# Patient Record
Sex: FEMALE | Race: WHITE | NOT HISPANIC OR LATINO | URBAN - METROPOLITAN AREA
[De-identification: names, ages, dates, MRNs, and addresses within clinical notes are randomized per-mention and may not be internally consistent; named-entity substitution may affect disease eponyms.]

---

## 2017-01-01 ENCOUNTER — HOME CARE/HOSPICE - HEALTHEAST (OUTPATIENT)
Dept: HOME HEALTH SERVICES | Facility: HOME HEALTH | Age: 82
End: 2017-01-01

## 2017-01-01 ENCOUNTER — RECORDS - HEALTHEAST (OUTPATIENT)
Dept: ADMINISTRATIVE | Facility: OTHER | Age: 82
End: 2017-01-01

## 2017-01-01 ENCOUNTER — OFFICE VISIT - HEALTHEAST (OUTPATIENT)
Dept: CARDIOLOGY | Facility: CLINIC | Age: 82
End: 2017-01-01

## 2017-01-01 ENCOUNTER — AMBULATORY - HEALTHEAST (OUTPATIENT)
Dept: CARDIOLOGY | Facility: CLINIC | Age: 82
End: 2017-01-01

## 2017-01-01 ENCOUNTER — INFUSION - HEALTHEAST (OUTPATIENT)
Dept: INFUSION THERAPY | Facility: HOSPITAL | Age: 82
End: 2017-01-01

## 2017-01-01 DIAGNOSIS — D64.9 ANEMIA: ICD-10-CM

## 2017-01-01 DIAGNOSIS — C91.10 CLL (CHRONIC LYMPHOCYTIC LEUKEMIA) (H): ICD-10-CM

## 2017-01-01 DIAGNOSIS — I35.0 SEVERE AORTIC STENOSIS: ICD-10-CM

## 2017-01-01 DIAGNOSIS — I35.0 AORTIC STENOSIS: ICD-10-CM

## 2017-01-01 DIAGNOSIS — I50.30 HEART FAILURE WITH PRESERVED EJECTION FRACTION (H): ICD-10-CM

## 2017-01-01 DIAGNOSIS — I10 ESSENTIAL HYPERTENSION: ICD-10-CM

## 2017-01-01 LAB
ATRIAL RATE - MUSE: 76 BPM
BLD PROD TYP BPU: NORMAL
BLD PROD TYP BPU: NORMAL
BLOOD EXPIRATION DATE: NORMAL
BLOOD EXPIRATION DATE: NORMAL
BLOOD TYPE: 5100
BLOOD TYPE: 5100
CODING SYSTEM: NORMAL
CODING SYSTEM: NORMAL
COMPONENT (HISTORICAL CONVERSION): NORMAL
COMPONENT (HISTORICAL CONVERSION): NORMAL
CROSSMATCH: NORMAL
CROSSMATCH: NORMAL
DIASTOLIC BLOOD PRESSURE - MUSE: NORMAL MMHG
INTERPRETATION ECG - MUSE: NORMAL
ISSUE DATE AND TIME: NORMAL
ISSUE DATE AND TIME: NORMAL
P AXIS - MUSE: 22 DEGREES
PR INTERVAL - MUSE: 154 MS
QRS DURATION - MUSE: 74 MS
QT - MUSE: 366 MS
QTC - MUSE: 411 MS
R AXIS - MUSE: -18 DEGREES
REF LAB ANTIBODY IDENTIFICATION: NORMAL
STATUS (HISTORICAL CONVERSION): NORMAL
STATUS (HISTORICAL CONVERSION): NORMAL
SYSTOLIC BLOOD PRESSURE - MUSE: NORMAL MMHG
T AXIS - MUSE: 71 DEGREES
UNIT ABO/RH (HISTORICAL CONVERSION): NORMAL
UNIT ABO/RH (HISTORICAL CONVERSION): NORMAL
UNIT NUMBER: NORMAL
UNIT NUMBER: NORMAL
VENTRICULAR RATE- MUSE: 76 BPM

## 2017-01-01 ASSESSMENT — MIFFLIN-ST. JEOR: SCORE: 1017.79

## 2017-01-18 ENCOUNTER — INFUSION - HEALTHEAST (OUTPATIENT)
Dept: INFUSION THERAPY | Facility: HOSPITAL | Age: 82
End: 2017-01-18

## 2017-01-18 ENCOUNTER — AMBULATORY - HEALTHEAST (OUTPATIENT)
Dept: INFUSION THERAPY | Facility: HOSPITAL | Age: 82
End: 2017-01-18

## 2017-01-18 ENCOUNTER — RECORDS - HEALTHEAST (OUTPATIENT)
Dept: ADMINISTRATIVE | Facility: OTHER | Age: 82
End: 2017-01-18

## 2017-01-18 DIAGNOSIS — C91.10 LEUKEMIA, LYMPHOCYTIC, CHRONIC (H): ICD-10-CM

## 2017-01-18 DIAGNOSIS — N18.9 ANEMIA OF CHRONIC RENAL FAILURE: ICD-10-CM

## 2017-01-18 DIAGNOSIS — D63.1 ANEMIA OF CHRONIC RENAL FAILURE: ICD-10-CM

## 2017-01-19 ENCOUNTER — INFUSION - HEALTHEAST (OUTPATIENT)
Dept: INFUSION THERAPY | Facility: HOSPITAL | Age: 82
End: 2017-01-19

## 2017-01-19 DIAGNOSIS — C91.10 CLL (CHRONIC LYMPHOCYTIC LEUKEMIA) (H): ICD-10-CM

## 2017-01-19 DIAGNOSIS — N18.9 ANEMIA OF CHRONIC RENAL FAILURE: ICD-10-CM

## 2017-01-19 DIAGNOSIS — C91.10 LEUKEMIA, LYMPHOCYTIC, CHRONIC (H): ICD-10-CM

## 2017-01-19 DIAGNOSIS — D63.1 ANEMIA OF CHRONIC RENAL FAILURE: ICD-10-CM

## 2017-01-19 LAB — ANTIBODY UNIDENTIFIED: NORMAL

## 2017-01-20 LAB
BLD PROD TYP BPU: NORMAL
BLD PROD TYP BPU: NORMAL
BLOOD EXPIRATION DATE: NORMAL
BLOOD EXPIRATION DATE: NORMAL
BLOOD TYPE: 5100
BLOOD TYPE: 5100
CODING SYSTEM: NORMAL
CODING SYSTEM: NORMAL
COMPONENT (HISTORICAL CONVERSION): NORMAL
COMPONENT (HISTORICAL CONVERSION): NORMAL
CROSSMATCH: NORMAL
CROSSMATCH: NORMAL
ISSUE DATE AND TIME: NORMAL
ISSUE DATE AND TIME: NORMAL
STATUS (HISTORICAL CONVERSION): NORMAL
STATUS (HISTORICAL CONVERSION): NORMAL
UNIT ABO/RH (HISTORICAL CONVERSION): NORMAL
UNIT ABO/RH (HISTORICAL CONVERSION): NORMAL
UNIT NUMBER: NORMAL
UNIT NUMBER: NORMAL

## 2017-01-22 ENCOUNTER — RECORDS - HEALTHEAST (OUTPATIENT)
Dept: ADMINISTRATIVE | Facility: OTHER | Age: 82
End: 2017-01-22

## 2017-01-25 LAB — REF LAB ANTIBODY IDENTIFICATION: NORMAL

## 2017-07-26 ENCOUNTER — RECORDS - HEALTHEAST (OUTPATIENT)
Dept: ADMINISTRATIVE | Facility: OTHER | Age: 82
End: 2017-07-26

## 2018-01-01 ENCOUNTER — RECORDS - HEALTHEAST (OUTPATIENT)
Dept: ADMINISTRATIVE | Facility: OTHER | Age: 83
End: 2018-01-01

## 2018-01-01 ENCOUNTER — COMMUNICATION - HEALTHEAST (OUTPATIENT)
Dept: CARDIOLOGY | Facility: CLINIC | Age: 83
End: 2018-01-01

## 2018-01-01 ENCOUNTER — ANESTHESIA - HEALTHEAST (OUTPATIENT)
Dept: SURGERY | Facility: HOSPITAL | Age: 83
End: 2018-01-01

## 2018-01-01 ENCOUNTER — OFFICE VISIT - HEALTHEAST (OUTPATIENT)
Dept: GERIATRICS | Facility: CLINIC | Age: 83
End: 2018-01-01

## 2018-01-01 ENCOUNTER — INFUSION - HEALTHEAST (OUTPATIENT)
Dept: INFUSION THERAPY | Facility: HOSPITAL | Age: 83
End: 2018-01-01

## 2018-01-01 ENCOUNTER — OFFICE VISIT - HEALTHEAST (OUTPATIENT)
Dept: CARDIOLOGY | Facility: CLINIC | Age: 83
End: 2018-01-01

## 2018-01-01 ENCOUNTER — SURGERY - HEALTHEAST (OUTPATIENT)
Dept: CARDIOLOGY | Facility: CLINIC | Age: 83
End: 2018-01-01

## 2018-01-01 ENCOUNTER — AMBULATORY - HEALTHEAST (OUTPATIENT)
Dept: CARDIOLOGY | Facility: CLINIC | Age: 83
End: 2018-01-01

## 2018-01-01 ENCOUNTER — COMMUNICATION - HEALTHEAST (OUTPATIENT)
Dept: HOME HEALTH SERVICES | Facility: HOME HEALTH | Age: 83
End: 2018-01-01

## 2018-01-01 ENCOUNTER — HOME CARE/HOSPICE - HEALTHEAST (OUTPATIENT)
Dept: HOME HEALTH SERVICES | Facility: HOME HEALTH | Age: 83
End: 2018-01-01

## 2018-01-01 ENCOUNTER — RECORDS - HEALTHEAST (OUTPATIENT)
Dept: LAB | Facility: CLINIC | Age: 83
End: 2018-01-01

## 2018-01-01 ENCOUNTER — ANESTHESIA - HEALTHEAST (OUTPATIENT)
Dept: SURGERY | Facility: CLINIC | Age: 83
End: 2018-01-01

## 2018-01-01 ENCOUNTER — AMBULATORY - HEALTHEAST (OUTPATIENT)
Dept: GERIATRICS | Facility: CLINIC | Age: 83
End: 2018-01-01

## 2018-01-01 ENCOUNTER — SURGERY - HEALTHEAST (OUTPATIENT)
Dept: SURGERY | Facility: HOSPITAL | Age: 83
End: 2018-01-01

## 2018-01-01 ENCOUNTER — SURGERY - HEALTHEAST (OUTPATIENT)
Dept: SURGERY | Facility: CLINIC | Age: 83
End: 2018-01-01

## 2018-01-01 ENCOUNTER — INFUSION - HEALTHEAST (OUTPATIENT)
Dept: INFUSION THERAPY | Facility: CLINIC | Age: 83
End: 2018-01-01

## 2018-01-01 ENCOUNTER — COMMUNICATION - HEALTHEAST (OUTPATIENT)
Dept: HEALTH INFORMATION MANAGEMENT | Facility: CLINIC | Age: 83
End: 2018-01-01

## 2018-01-01 ENCOUNTER — HOME CARE/HOSPICE - HEALTHEAST (OUTPATIENT)
Dept: HOSPICE | Facility: HOSPICE | Age: 83
End: 2018-01-01

## 2018-01-01 ENCOUNTER — COMMUNICATION - HEALTHEAST (OUTPATIENT)
Dept: INFUSION THERAPY | Facility: HOSPITAL | Age: 83
End: 2018-01-01

## 2018-01-01 ENCOUNTER — HOSPITAL ENCOUNTER (OUTPATIENT)
Dept: RADIOLOGY | Facility: CLINIC | Age: 83
Discharge: HOME | End: 2018-06-11
Attending: INTERNAL MEDICINE

## 2018-01-01 ENCOUNTER — ANESTHESIA - HEALTHEAST (OUTPATIENT)
Dept: CARDIOLOGY | Facility: CLINIC | Age: 83
End: 2018-01-01

## 2018-01-01 ENCOUNTER — AMBULATORY - HEALTHEAST (OUTPATIENT)
Dept: INFUSION THERAPY | Facility: HOSPITAL | Age: 83
End: 2018-01-01

## 2018-01-01 ENCOUNTER — AMBULATORY - HEALTHEAST (OUTPATIENT)
Dept: MEDSURG UNIT | Facility: HOSPITAL | Age: 83
End: 2018-01-01

## 2018-01-01 ENCOUNTER — HOSPITAL ENCOUNTER (OUTPATIENT)
Dept: CARDIOLOGY | Facility: CLINIC | Age: 83
Discharge: HOME | End: 2018-07-12
Attending: INTERNAL MEDICINE

## 2018-01-01 DIAGNOSIS — I35.0 AORTIC STENOSIS: ICD-10-CM

## 2018-01-01 DIAGNOSIS — C91.10 CHRONIC LYMPHOID LEUKEMIA (H): ICD-10-CM

## 2018-01-01 DIAGNOSIS — Z95.2 S/P TAVR (TRANSCATHETER AORTIC VALVE REPLACEMENT): ICD-10-CM

## 2018-01-01 DIAGNOSIS — C91.10 CLL (CHRONIC LYMPHOCYTIC LEUKEMIA) (H): ICD-10-CM

## 2018-01-01 DIAGNOSIS — D64.9 ANEMIA: ICD-10-CM

## 2018-01-01 DIAGNOSIS — I48.91 ATRIAL FIBRILLATION (H): ICD-10-CM

## 2018-01-01 DIAGNOSIS — D64.9 POSTOPERATIVE ANEMIA: ICD-10-CM

## 2018-01-01 DIAGNOSIS — I50.9 CONGESTIVE HEART FAILURE (H): ICD-10-CM

## 2018-01-01 DIAGNOSIS — D72.829 LEUKOCYTOSIS: ICD-10-CM

## 2018-01-01 DIAGNOSIS — I50.23 ACUTE ON CHRONIC SYSTOLIC HEART FAILURE, NYHA CLASS 3 (H): ICD-10-CM

## 2018-01-01 DIAGNOSIS — A04.72 C. DIFFICILE COLITIS: ICD-10-CM

## 2018-01-01 DIAGNOSIS — I50.9 CHF (CONGESTIVE HEART FAILURE) (H): ICD-10-CM

## 2018-01-01 DIAGNOSIS — J18.9 PNEUMONIA: ICD-10-CM

## 2018-01-01 DIAGNOSIS — N17.9 ACUTE KIDNEY INJURY (H): ICD-10-CM

## 2018-01-01 DIAGNOSIS — I35.0 AORTIC VALVE STENOSIS, ETIOLOGY OF CARDIAC VALVE DISEASE UNSPECIFIED: ICD-10-CM

## 2018-01-01 DIAGNOSIS — I50.30 HEART FAILURE WITH PRESERVED EJECTION FRACTION (H): ICD-10-CM

## 2018-01-01 DIAGNOSIS — C91.10 CHRONIC LYMPHOCYTIC LEUKEMIA (H): ICD-10-CM

## 2018-01-01 DIAGNOSIS — I35.0 SEVERE AORTIC STENOSIS: ICD-10-CM

## 2018-01-01 DIAGNOSIS — I48.19 PERSISTENT ATRIAL FIBRILLATION (H): ICD-10-CM

## 2018-01-01 DIAGNOSIS — D64.9 ANEMIA, UNSPECIFIED TYPE: ICD-10-CM

## 2018-01-01 DIAGNOSIS — J96.01 ACUTE RESPIRATORY FAILURE WITH HYPOXIA (H): ICD-10-CM

## 2018-01-01 DIAGNOSIS — I50.30 DIASTOLIC CONGESTIVE HEART FAILURE (H): ICD-10-CM

## 2018-01-01 DIAGNOSIS — K64.9 HEMORRHOID: ICD-10-CM

## 2018-01-01 DIAGNOSIS — H10.029 PINK EYE: ICD-10-CM

## 2018-01-01 LAB
ABO/RH(D): ABNORMAL
ABO/RH(D): NORMAL
ALBUMIN SERPL-MCNC: 2.4 G/DL (ref 3.5–5)
ALBUMIN SERPL-MCNC: 3.1 G/DL (ref 3.5–5)
ALBUMIN SERPL-MCNC: 3.2 G/DL (ref 3.5–5)
ALBUMIN SERPL-MCNC: 3.3 G/DL (ref 3.5–5)
ALP SERPL-CCNC: 275 U/L (ref 45–120)
ALP SERPL-CCNC: 84 U/L (ref 45–120)
ALP SERPL-CCNC: 88 U/L (ref 45–120)
ALP SERPL-CCNC: 99 U/L (ref 45–120)
ALT SERPL W P-5'-P-CCNC: 11 U/L (ref 0–45)
ALT SERPL W P-5'-P-CCNC: 14 U/L (ref 0–45)
ALT SERPL W P-5'-P-CCNC: 19 U/L (ref 0–45)
ALT SERPL W P-5'-P-CCNC: 26 U/L (ref 0–45)
ANION GAP SERPL CALCULATED.3IONS-SCNC: 12 MMOL/L (ref 5–18)
ANION GAP SERPL CALCULATED.3IONS-SCNC: 13 MMOL/L (ref 5–18)
ANION GAP SERPL CALCULATED.3IONS-SCNC: 13 MMOL/L (ref 5–18)
ANION GAP SERPL CALCULATED.3IONS-SCNC: 14 MMOL/L (ref 5–18)
ANION GAP SERPL CALCULATED.3IONS-SCNC: 15 MMOL/L (ref 5–18)
ANION GAP SERPL CALCULATED.3IONS-SCNC: 16 MMOL/L (ref 5–18)
ANION GAP SERPL CALCULATED.3IONS-SCNC: 16 MMOL/L (ref 5–18)
ANTIBODY SCREEN: NEGATIVE
ANTIBODY SCREEN: POSITIVE
ANTIBODY UNIDENTIFIED: NORMAL
AORTIC ROOT: 2.9 CM
AORTIC VALVE MEAN VELOCITY: 174 CM/S
AST SERPL W P-5'-P-CCNC: 18 U/L (ref 0–40)
AST SERPL W P-5'-P-CCNC: 20 U/L (ref 0–40)
AST SERPL W P-5'-P-CCNC: 21 U/L (ref 0–40)
AST SERPL W P-5'-P-CCNC: 31 U/L (ref 0–40)
ATRIAL RATE - MUSE: 69 BPM
ATRIAL RATE - MUSE: 75 BPM
AV DIMENSIONLESS INDEX VTI: 0.3
AV MEAN GRADIENT: 15 MMHG
AV PEAK GRADIENT: 30.9 MMHG
AV VALVE AREA: 0.7 CM2
AV VELOCITY RATIO: 0.3
BACTERIA SPEC CULT: ABNORMAL
BASOPHILS # BLD AUTO: 0 THOU/UL (ref 0–0.2)
BASOPHILS NFR BLD AUTO: 0 % (ref 0–2)
BILIRUB SERPL-MCNC: 0.4 MG/DL (ref 0–1)
BILIRUB SERPL-MCNC: 0.5 MG/DL (ref 0–1)
BILIRUB SERPL-MCNC: 0.6 MG/DL (ref 0–1)
BILIRUB SERPL-MCNC: 2 MG/DL (ref 0–1)
BLD PROD TYP BPU: NORMAL
BLOOD EXPIRATION DATE: NORMAL
BLOOD TYPE: 5100
BNP SERPL-MCNC: 2528 PG/ML (ref 0–167)
BNP SERPL-MCNC: 563 PG/ML (ref 0–167)
BNP SERPL-MCNC: 696 PG/ML (ref 0–167)
BNP SERPL-MCNC: 878 PG/ML (ref 0–167)
BSA FOR ECHO PROCEDURE: 1.57 M2
BUN SERPL-MCNC: 38 MG/DL (ref 8–28)
BUN SERPL-MCNC: 40 MG/DL (ref 8–28)
BUN SERPL-MCNC: 42 MG/DL (ref 8–28)
BUN SERPL-MCNC: 46 MG/DL (ref 8–28)
BUN SERPL-MCNC: 47 MG/DL (ref 8–28)
BUN SERPL-MCNC: 57 MG/DL (ref 8–28)
BUN SERPL-MCNC: 62 MG/DL (ref 8–28)
BUN SERPL-MCNC: 66 MG/DL (ref 8–28)
BUN SERPL-MCNC: 66 MG/DL (ref 8–28)
BUN SERPL-MCNC: 72 MG/DL (ref 8–28)
BUN SERPL-MCNC: 73 MG/DL (ref 8–28)
BUN SERPL-MCNC: 88 MG/DL (ref 8–28)
CALCIUM SERPL-MCNC: 7.7 MG/DL (ref 8.5–10.5)
CALCIUM SERPL-MCNC: 7.8 MG/DL (ref 8.5–10.5)
CALCIUM SERPL-MCNC: 8.1 MG/DL (ref 8.5–10.5)
CALCIUM SERPL-MCNC: 8.1 MG/DL (ref 8.5–10.5)
CALCIUM SERPL-MCNC: 8.3 MG/DL (ref 8.5–10.5)
CALCIUM SERPL-MCNC: 8.3 MG/DL (ref 8.5–10.5)
CALCIUM SERPL-MCNC: 8.5 MG/DL (ref 8.5–10.5)
CALCIUM SERPL-MCNC: 8.5 MG/DL (ref 8.5–10.5)
CALCIUM SERPL-MCNC: 8.6 MG/DL (ref 8.5–10.5)
CALCIUM SERPL-MCNC: 8.6 MG/DL (ref 8.5–10.5)
CALCIUM SERPL-MCNC: 8.8 MG/DL (ref 8.5–10.5)
CALCIUM SERPL-MCNC: 9.2 MG/DL (ref 8.5–10.5)
CALCIUM, IONIZED MEASURED: 1.07 MMOL/L (ref 1.11–1.3)
CHLORIDE BLD-SCNC: 101 MMOL/L (ref 98–107)
CHLORIDE BLD-SCNC: 103 MMOL/L (ref 98–107)
CHLORIDE BLD-SCNC: 105 MMOL/L (ref 98–107)
CHLORIDE BLD-SCNC: 90 MMOL/L (ref 98–107)
CHLORIDE BLD-SCNC: 96 MMOL/L (ref 98–107)
CHLORIDE BLD-SCNC: 97 MMOL/L (ref 98–107)
CHLORIDE BLD-SCNC: 98 MMOL/L (ref 98–107)
CHLORIDE BLD-SCNC: 99 MMOL/L (ref 98–107)
CO2 SERPL-SCNC: 19 MMOL/L (ref 22–31)
CO2 SERPL-SCNC: 19 MMOL/L (ref 22–31)
CO2 SERPL-SCNC: 22 MMOL/L (ref 22–31)
CO2 SERPL-SCNC: 23 MMOL/L (ref 22–31)
CO2 SERPL-SCNC: 24 MMOL/L (ref 22–31)
CO2 SERPL-SCNC: 25 MMOL/L (ref 22–31)
CO2 SERPL-SCNC: 26 MMOL/L (ref 22–31)
CODING SYSTEM: NORMAL
COMPONENT (HISTORICAL CONVERSION): NORMAL
CREAT SERPL-MCNC: 1.13 MG/DL (ref 0.6–1.1)
CREAT SERPL-MCNC: 1.33 MG/DL (ref 0.6–1.1)
CREAT SERPL-MCNC: 1.36 MG/DL (ref 0.6–1.1)
CREAT SERPL-MCNC: 1.39 MG/DL (ref 0.6–1.1)
CREAT SERPL-MCNC: 1.4 MG/DL (ref 0.6–1.1)
CREAT SERPL-MCNC: 1.45 MG/DL (ref 0.6–1.1)
CREAT SERPL-MCNC: 1.47 MG/DL (ref 0.6–1.1)
CREAT SERPL-MCNC: 1.48 MG/DL (ref 0.6–1.1)
CREAT SERPL-MCNC: 1.49 MG/DL (ref 0.6–1.1)
CREAT SERPL-MCNC: 1.63 MG/DL (ref 0.6–1.1)
CREAT SERPL-MCNC: 1.64 MG/DL (ref 0.6–1.1)
CREAT SERPL-MCNC: 1.86 MG/DL (ref 0.6–1.1)
CROSSMATCH: NORMAL
CV BLOOD PRESSURE: NORMAL MMHG
CV ECHO HEIGHT: 62 IN
CV ECHO WEIGHT: 125 LBS
DIASTOLIC BLOOD PRESSURE - MUSE: NORMAL MMHG
DIASTOLIC BLOOD PRESSURE - MUSE: NORMAL MMHG
DOP CALC AO PEAK VEL: 278 CM/S
DOP CALC AO VTI: 49 CM
DOP CALC LVOT AREA: 2.54 CM2
DOP CALC LVOT DIAMETER: 1.8 CM
DOP CALC LVOT PEAK VEL: 79.7 CM/S
DOP CALC LVOT STROKE VOLUME: 35.9 CM3
DOP CALC RVOT PEAK VEL: 85.4 CM/S
DOP CALC RVOT VTI: 15 CM
DOP CALC RVOT VTI: 15 CM
DOP CALCLVOT PEAK VEL VTI: 14.1 CM
EJECTION FRACTION: 56 % (ref 55–75)
EOSINOPHIL COUNT (ABSOLUTE): 0 THOU/UL (ref 0–0.4)
EOSINOPHIL COUNT (ABSOLUTE): 0.2 THOU/UL (ref 0–0.4)
EOSINOPHIL NFR BLD AUTO: 0 % (ref 0–6)
EOSINOPHIL NFR BLD AUTO: 1 % (ref 0–6)
ERYTHROCYTE [DISTWIDTH] IN BLOOD BY AUTOMATED COUNT: 17 % (ref 11–14.5)
ERYTHROCYTE [DISTWIDTH] IN BLOOD BY AUTOMATED COUNT: 17.2 % (ref 11–14.5)
ERYTHROCYTE [DISTWIDTH] IN BLOOD BY AUTOMATED COUNT: 17.3 % (ref 11–14.5)
ERYTHROCYTE [DISTWIDTH] IN BLOOD BY AUTOMATED COUNT: 18 % (ref 11–14.5)
ERYTHROCYTE [DISTWIDTH] IN BLOOD BY AUTOMATED COUNT: 18.9 % (ref 11–14.5)
ERYTHROCYTE [DISTWIDTH] IN BLOOD BY AUTOMATED COUNT: 19.3 % (ref 11–14.5)
ERYTHROCYTE [DISTWIDTH] IN BLOOD BY AUTOMATED COUNT: 19.3 % (ref 11–14.5)
ERYTHROCYTE [DISTWIDTH] IN BLOOD BY AUTOMATED COUNT: 19.7 % (ref 11–14.5)
ERYTHROCYTE [DISTWIDTH] IN BLOOD BY AUTOMATED COUNT: 20 % (ref 11–14.5)
ERYTHROCYTE [DISTWIDTH] IN BLOOD BY AUTOMATED COUNT: 20.6 % (ref 11–14.5)
FRACTIONAL SHORTENING: 33.3 % (ref 28–44)
GFR SERPL CREATININE-BSD FRML MDRD: 26 ML/MIN/1.73M2
GFR SERPL CREATININE-BSD FRML MDRD: 29 ML/MIN/1.73M2
GFR SERPL CREATININE-BSD FRML MDRD: 30 ML/MIN/1.73M2
GFR SERPL CREATININE-BSD FRML MDRD: 33 ML/MIN/1.73M2
GFR SERPL CREATININE-BSD FRML MDRD: 34 ML/MIN/1.73M2
GFR SERPL CREATININE-BSD FRML MDRD: 35 ML/MIN/1.73M2
GFR SERPL CREATININE-BSD FRML MDRD: 36 ML/MIN/1.73M2
GFR SERPL CREATININE-BSD FRML MDRD: 37 ML/MIN/1.73M2
GFR SERPL CREATININE-BSD FRML MDRD: 38 ML/MIN/1.73M2
GFR SERPL CREATININE-BSD FRML MDRD: 45 ML/MIN/1.73M2
GLUCOSE BLD-MCNC: 124 MG/DL (ref 70–125)
GLUCOSE BLD-MCNC: 129 MG/DL (ref 70–125)
GLUCOSE BLD-MCNC: 132 MG/DL (ref 70–125)
GLUCOSE BLD-MCNC: 147 MG/DL (ref 70–125)
GLUCOSE BLD-MCNC: 162 MG/DL (ref 70–125)
GLUCOSE BLD-MCNC: 27 MG/DL (ref 70–125)
GLUCOSE BLD-MCNC: 64 MG/DL (ref 70–125)
GLUCOSE BLD-MCNC: 64 MG/DL (ref 70–125)
GLUCOSE BLD-MCNC: 67 MG/DL (ref 70–125)
GLUCOSE BLD-MCNC: 77 MG/DL (ref 70–125)
GLUCOSE BLD-MCNC: 81 MG/DL (ref 70–125)
GLUCOSE BLD-MCNC: 96 MG/DL (ref 70–125)
HCT VFR BLD AUTO: 22.5 % (ref 35–47)
HCT VFR BLD AUTO: 23.3 % (ref 35–47)
HCT VFR BLD AUTO: 25.4 % (ref 35–47)
HCT VFR BLD AUTO: 25.6 % (ref 35–47)
HCT VFR BLD AUTO: 25.6 % (ref 35–47)
HCT VFR BLD AUTO: 26.2 % (ref 35–47)
HCT VFR BLD AUTO: 26.9 % (ref 35–47)
HCT VFR BLD AUTO: 27.6 % (ref 35–47)
HCT VFR BLD AUTO: 28.3 % (ref 35–47)
HCT VFR BLD AUTO: 28.6 % (ref 35–47)
HGB BLD-MCNC: 6.5 G/DL (ref 12–16)
HGB BLD-MCNC: 7.1 G/DL (ref 12–16)
HGB BLD-MCNC: 7.2 G/DL (ref 12–16)
HGB BLD-MCNC: 7.2 G/DL (ref 12–16)
HGB BLD-MCNC: 7.3 G/DL (ref 12–16)
HGB BLD-MCNC: 7.6 G/DL (ref 12–16)
HGB BLD-MCNC: 7.8 G/DL (ref 12–16)
HGB BLD-MCNC: 7.9 G/DL (ref 12–16)
HGB BLD-MCNC: 8.1 G/DL (ref 12–16)
HGB BLD-MCNC: 8.2 G/DL (ref 12–16)
HGB BLD-MCNC: 8.5 G/DL (ref 12–16)
HGB BLD-MCNC: 8.6 G/DL (ref 12–16)
INR PPP: 1.05 (ref 0.9–1.1)
INTERPRETATION ECG - MUSE: NORMAL
INTERPRETATION ECG - MUSE: NORMAL
INTERVENTRICULAR SEPTUM IN END DIASTOLE: 1.4 CM (ref 0.6–0.9)
ION CA PH 7.4: 1.08 MMOL/L (ref 1.11–1.3)
ISSUE DATE AND TIME: NORMAL
IVS/PW RATIO: 0.9
LA AREA 1: 19.7 CM2
LA AREA 2: 16.4 CM2
LEFT ATRIUM LENGTH: 4.7 CM
LEFT ATRIUM SIZE: 4.1 CM
LEFT ATRIUM VOLUME INDEX: 37.2 ML/M2
LEFT ATRIUM VOLUME: 58.4 ML
LEFT VENTRICLE DIASTOLIC VOLUME INDEX: 47.8 CM3/M2 (ref 34–74)
LEFT VENTRICLE DIASTOLIC VOLUME: 75 CM3 (ref 46–106)
LEFT VENTRICLE HEART RATE: 63 BPM
LEFT VENTRICLE HEART RATE: 68 BPM
LEFT VENTRICLE MASS INDEX: 135.6 G/M2
LEFT VENTRICLE SYSTOLIC VOLUME INDEX: 21 CM3/M2 (ref 11–31)
LEFT VENTRICLE SYSTOLIC VOLUME: 33 CM3 (ref 14–42)
LEFT VENTRICULAR INTERNAL DIMENSION IN DIASTOLE: 3.9 CM (ref 3.8–5.2)
LEFT VENTRICULAR INTERNAL DIMENSION IN SYSTOLE: 2.6 CM (ref 2.2–3.5)
LEFT VENTRICULAR MASS: 212.9 G
LEFT VENTRICULAR OUTFLOW TRACT MEAN GRADIENT: 1 MMHG
LEFT VENTRICULAR OUTFLOW TRACT MEAN VELOCITY: 54.2 CM/S
LEFT VENTRICULAR OUTFLOW TRACT PEAK GRADIENT: 3 MMHG
LEFT VENTRICULAR POSTERIOR WALL IN END DIASTOLE: 1.5 CM (ref 0.6–0.9)
LV STROKE VOLUME INDEX: 22.8 ML/M2
LYMPHOCYTES # BLD AUTO: 161 THOU/UL (ref 0.8–4.4)
LYMPHOCYTES # BLD AUTO: 18.4 THOU/UL (ref 0.8–4.4)
LYMPHOCYTES # BLD AUTO: 240.2 THOU/UL (ref 0.8–4.4)
LYMPHOCYTES # BLD AUTO: 490.5 THOU/UL (ref 0.8–4.4)
LYMPHOCYTES NFR BLD AUTO: 39 % (ref 20–40)
LYMPHOCYTES NFR BLD AUTO: 83 % (ref 20–40)
LYMPHOCYTES NFR BLD AUTO: 89 % (ref 20–40)
LYMPHOCYTES NFR BLD AUTO: 93 % (ref 20–40)
MAGNESIUM SERPL-MCNC: 2.5 MG/DL (ref 1.8–2.6)
MANUAL NRBC PER 100 CELLS: 1
MCH RBC QN AUTO: 25.7 PG (ref 27–34)
MCH RBC QN AUTO: 26.4 PG (ref 27–34)
MCH RBC QN AUTO: 26.5 PG (ref 27–34)
MCH RBC QN AUTO: 28.1 PG (ref 27–34)
MCH RBC QN AUTO: 28.2 PG (ref 27–34)
MCH RBC QN AUTO: 28.5 PG (ref 27–34)
MCH RBC QN AUTO: 28.7 PG (ref 27–34)
MCH RBC QN AUTO: 29 PG (ref 27–34)
MCH RBC QN AUTO: 29.1 PG (ref 27–34)
MCH RBC QN AUTO: 32 PG (ref 27–34)
MCHC RBC AUTO-ENTMCNC: 27.7 G/DL (ref 32–36)
MCHC RBC AUTO-ENTMCNC: 28.1 G/DL (ref 32–36)
MCHC RBC AUTO-ENTMCNC: 28.3 G/DL (ref 32–36)
MCHC RBC AUTO-ENTMCNC: 28.9 G/DL (ref 32–36)
MCHC RBC AUTO-ENTMCNC: 29.7 G/DL (ref 32–36)
MCHC RBC AUTO-ENTMCNC: 29.7 G/DL (ref 32–36)
MCHC RBC AUTO-ENTMCNC: 29.8 G/DL (ref 32–36)
MCHC RBC AUTO-ENTMCNC: 30.1 G/DL (ref 32–36)
MCHC RBC AUTO-ENTMCNC: 30.4 G/DL (ref 32–36)
MCHC RBC AUTO-ENTMCNC: 31.3 G/DL (ref 32–36)
MCV RBC AUTO: 100 FL (ref 80–100)
MCV RBC AUTO: 108 FL (ref 80–100)
MCV RBC AUTO: 91 FL (ref 80–100)
MCV RBC AUTO: 91 FL (ref 80–100)
MCV RBC AUTO: 93 FL (ref 80–100)
MCV RBC AUTO: 94 FL (ref 80–100)
MCV RBC AUTO: 95 FL (ref 80–100)
MCV RBC AUTO: 95 FL (ref 80–100)
MCV RBC AUTO: 96 FL (ref 80–100)
MCV RBC AUTO: 97 FL (ref 80–100)
METAMYELOCYTES (ABSOLUTE): 1 THOU/UL
METAMYELOCYTES NFR BLD MANUAL: 1 %
MITRAL VALVE E/A RATIO: 0.5
MONOCYTES # BLD AUTO: 1.3 THOU/UL (ref 0–0.9)
MONOCYTES # BLD AUTO: 2 THOU/UL (ref 0–0.9)
MONOCYTES # BLD AUTO: 2.6 THOU/UL (ref 0–0.9)
MONOCYTES # BLD AUTO: 7.9 THOU/UL (ref 0–0.9)
MONOCYTES NFR BLD AUTO: 1 % (ref 2–10)
MONOCYTES NFR BLD AUTO: 1 % (ref 2–10)
MONOCYTES NFR BLD AUTO: 2 % (ref 2–10)
MONOCYTES NFR BLD AUTO: 6 % (ref 2–10)
MV AVERAGE E/E' RATIO: 20.4 CM/S
MV DECELERATION TIME: 433 MS
MV E'TISSUE VEL-LAT: 3.7 CM/S
MV E'TISSUE VEL-MED: 3.51 CM/S
MV LATERAL E/E' RATIO: 19.9
MV MEDIAL E/E' RATIO: 20.9
MV PEAK A VELOCITY: 143 CM/S
MV PEAK E VELOCITY: 73.5 CM/S
MYELOCYTES (ABSOLUTE): 0.7 THOU/UL
MYELOCYTES NFR BLD MANUAL: 2 %
NUC REST DIASTOLIC VOLUME INDEX: 2000 LBS
NUC REST SYSTOLIC VOLUME INDEX: 62 IN
OVALOCYTES: ABNORMAL
OVALOCYTES: ABNORMAL
P AXIS - MUSE: NORMAL DEGREES
P AXIS - MUSE: NORMAL DEGREES
PH: 7.43 (ref 7.35–7.45)
PHOSPHATE SERPL-MCNC: 4.7 MG/DL (ref 2.5–4.5)
PLAT MORPH BLD: NORMAL
PLATELET # BLD AUTO: 143 THOU/UL (ref 140–440)
PLATELET # BLD AUTO: 169 THOU/UL (ref 140–440)
PLATELET # BLD AUTO: 169 THOU/UL (ref 140–440)
PLATELET # BLD AUTO: 181 THOU/UL (ref 140–440)
PLATELET # BLD AUTO: 190 THOU/UL (ref 140–440)
PLATELET # BLD AUTO: 204 THOU/UL (ref 140–440)
PLATELET # BLD AUTO: 211 THOU/UL (ref 140–440)
PLATELET # BLD AUTO: 262 THOU/UL (ref 140–440)
PLATELET # BLD AUTO: 302 THOU/UL (ref 140–440)
PLATELET # BLD AUTO: 73 THOU/UL (ref 140–440)
PMV BLD AUTO: 10.5 FL (ref 8.5–12.5)
PMV BLD AUTO: 10.7 FL (ref 8.5–12.5)
PMV BLD AUTO: 10.9 FL (ref 8.5–12.5)
PMV BLD AUTO: 11.3 FL (ref 8.5–12.5)
PMV BLD AUTO: 11.3 FL (ref 8.5–12.5)
PMV BLD AUTO: 11.7 FL (ref 8.5–12.5)
PMV BLD AUTO: 12 FL (ref 8.5–12.5)
PMV BLD AUTO: 12 FL (ref 8.5–12.5)
PMV BLD AUTO: 12.5 FL (ref 8.5–12.5)
PMV BLD AUTO: 12.7 FL (ref 8.5–12.5)
POLYCHROMASIA BLD QL SMEAR: ABNORMAL
POTASSIUM BLD-SCNC: 3.3 MMOL/L (ref 3.5–5)
POTASSIUM BLD-SCNC: 3.4 MMOL/L (ref 3.5–5)
POTASSIUM BLD-SCNC: 3.4 MMOL/L (ref 3.5–5)
POTASSIUM BLD-SCNC: 4 MMOL/L (ref 3.5–5)
POTASSIUM BLD-SCNC: 4.1 MMOL/L (ref 3.5–5)
POTASSIUM BLD-SCNC: 4.2 MMOL/L (ref 3.5–5)
POTASSIUM BLD-SCNC: 4.4 MMOL/L (ref 3.5–5)
POTASSIUM BLD-SCNC: 4.5 MMOL/L (ref 3.5–5)
POTASSIUM BLD-SCNC: 5.1 MMOL/L (ref 3.5–5)
POTASSIUM BLD-SCNC: 5.3 MMOL/L (ref 3.5–5)
PR INTERVAL - MUSE: 134 MS
PR INTERVAL - MUSE: 140 MS
PR MAX PG: 4 MMHG
PR PEAK VELOCITY: 106 CM/S
PROT SERPL-MCNC: 5 G/DL (ref 6–8)
PROT SERPL-MCNC: 5.4 G/DL (ref 6–8)
PROT SERPL-MCNC: 5.7 G/DL (ref 6–8)
PROT SERPL-MCNC: 5.8 G/DL (ref 6–8)
PV MEAN GRADIENT: 6 MMHG
PV MEAN VELOCITY: 104 CM/S
PV PEAK GRADIENT: 12.8 MMHG
PV VELOCITY TIME INTERVAL: 14.3 CM
PV VELOCITY TIME INTERVAL: 21.3 CM
PV VELOCITY TIME INTERVAL: 28.3 CM
PV VMAX: 179 CM/S
QRS DURATION - MUSE: 150 MS
QRS DURATION - MUSE: 150 MS
QT - MUSE: 450 MS
QT - MUSE: 460 MS
QTC - MUSE: 492 MS
QTC - MUSE: 502 MS
R AXIS - MUSE: -35 DEGREES
R AXIS - MUSE: -39 DEGREES
RBC # BLD AUTO: 2.24 MILL/UL (ref 3.8–5.4)
RBC # BLD AUTO: 2.56 MILL/UL (ref 3.8–5.4)
RBC # BLD AUTO: 2.56 MILL/UL (ref 3.8–5.4)
RBC # BLD AUTO: 2.68 MILL/UL (ref 3.8–5.4)
RBC # BLD AUTO: 2.73 MILL/UL (ref 3.8–5.4)
RBC # BLD AUTO: 2.77 MILL/UL (ref 3.8–5.4)
RBC # BLD AUTO: 2.78 MILL/UL (ref 3.8–5.4)
RBC # BLD AUTO: 2.8 MILL/UL (ref 3.8–5.4)
RBC # BLD AUTO: 3 MILL/UL (ref 3.8–5.4)
RBC # BLD AUTO: 3.02 MILL/UL (ref 3.8–5.4)
REF LAB ANTIBODY IDENTIFICATION: NORMAL
REF LAB ANTIBODY IDENTIFICATION: NORMAL
RIGHT VENTRICULAR OUTFLOW PEAK GRADIENT: 3 MMHG
RIGHT VENTRICULAR OUTFLOW TRACT MEAN GRADIENT: 2 MMHG
RIGHT VENTRICULAR OUTFLOW TRACT MEAN GRADIENT: 2 MMHG
RVOT MV: 62.2 CM/S
RVOT MV: 62.2 CM/S
SMUDGE CELLS BLD QL SMEAR: PRESENT
SODIUM SERPL-SCNC: 128 MMOL/L (ref 136–145)
SODIUM SERPL-SCNC: 131 MMOL/L (ref 136–145)
SODIUM SERPL-SCNC: 132 MMOL/L (ref 136–145)
SODIUM SERPL-SCNC: 134 MMOL/L (ref 136–145)
SODIUM SERPL-SCNC: 134 MMOL/L (ref 136–145)
SODIUM SERPL-SCNC: 135 MMOL/L (ref 136–145)
SODIUM SERPL-SCNC: 135 MMOL/L (ref 136–145)
SODIUM SERPL-SCNC: 136 MMOL/L (ref 136–145)
SODIUM SERPL-SCNC: 137 MMOL/L (ref 136–145)
SODIUM SERPL-SCNC: 137 MMOL/L (ref 136–145)
SODIUM SERPL-SCNC: 139 MMOL/L (ref 136–145)
SODIUM SERPL-SCNC: 140 MMOL/L (ref 136–145)
STATUS (HISTORICAL CONVERSION): NORMAL
SYSTOLIC BLOOD PRESSURE - MUSE: NORMAL MMHG
SYSTOLIC BLOOD PRESSURE - MUSE: NORMAL MMHG
T AXIS - MUSE: 129 DEGREES
T AXIS - MUSE: 134 DEGREES
TOTAL NEUTROPHILS-ABS(DIFF): 25.2 THOU/UL (ref 2–7.7)
TOTAL NEUTROPHILS-ABS(DIFF): 28.3 THOU/UL (ref 2–7.7)
TOTAL NEUTROPHILS-ABS(DIFF): 29 THOU/UL (ref 2–7.7)
TOTAL NEUTROPHILS-ABS(DIFF): 31.2 THOU/UL (ref 2–7.7)
TOTAL NEUTROPHILS-REL(DIFF): 11 % (ref 50–70)
TOTAL NEUTROPHILS-REL(DIFF): 16 % (ref 50–70)
TOTAL NEUTROPHILS-REL(DIFF): 54 % (ref 50–70)
TOTAL NEUTROPHILS-REL(DIFF): 6 % (ref 50–70)
TRICUSPID REGURGITATION PEAK PRESSURE GRADIENT: 41 MMHG
TRICUSPID VALVE PEAK REGURGITANT VELOCITY: 320 CM/S
UNIT ABO/RH (HISTORICAL CONVERSION): NORMAL
UNIT NUMBER: NORMAL
URATE SERPL-MCNC: 23.6 MG/DL (ref 2–7.5)
VENTRICULAR RATE- MUSE: 69 BPM
VENTRICULAR RATE- MUSE: 75 BPM
WBC: 122.3 THOU/UL (ref 4–11)
WBC: 141.3 THOU/UL (ref 4–11)
WBC: 186.7 THOU/UL (ref 4–11)
WBC: 195.2 THOU/UL (ref 4–11)
WBC: 220.5 THOU/UL (ref 4–11)
WBC: 249.5 THOU/UL (ref 4–11)
WBC: 269.9 THOU/UL (ref 4–11)
WBC: 361 THOU/UL (ref 4–11)
WBC: 455.1 THOU/UL (ref 4–11)
WBC: 47.1 THOU/UL (ref 4–11)
WBC: 527.4 THOU/UL (ref 4–11)

## 2018-01-01 ASSESSMENT — MIFFLIN-ST. JEOR
SCORE: 1000.1
SCORE: 1042.74
SCORE: 954.29
SCORE: 994.2
SCORE: 983.32
SCORE: 1006.9
SCORE: 1025.5
SCORE: 1021.87
SCORE: 1003.73
SCORE: 1025.5
SCORE: 1030.94
SCORE: 1003.25
SCORE: 1041.83
SCORE: 1057.25
SCORE: 1050.45
SCORE: 996.47
SCORE: 1050.45
SCORE: 1044.1
SCORE: 1042.74
SCORE: 1025.5
SCORE: 1030.94
SCORE: 1016.43
SCORE: 1025.5
SCORE: 1021.87
SCORE: 971.07
SCORE: 987.85
SCORE: 1057.25
SCORE: 952.93
SCORE: 1034.57
SCORE: 957.46
SCORE: 930.25
SCORE: 1034.57
SCORE: 1016.43
SCORE: 912.1
SCORE: 998.29
SCORE: 943.4
SCORE: 1042.74
SCORE: 988.31
SCORE: 966.53
SCORE: 976.06
SCORE: 957.46
SCORE: 1044.1
SCORE: 1042.74
SCORE: 1041.83
SCORE: 1019.25
SCORE: 958.37

## 2019-11-18 ENCOUNTER — HOME CARE/HOSPICE - HEALTHEAST (OUTPATIENT)
Dept: HOSPICE | Facility: HOSPICE | Age: 84
End: 2019-11-18

## 2021-05-27 ENCOUNTER — RECORDS - HEALTHEAST (OUTPATIENT)
Dept: ADMINISTRATIVE | Facility: CLINIC | Age: 86
End: 2021-05-27

## 2021-05-29 ENCOUNTER — RECORDS - HEALTHEAST (OUTPATIENT)
Dept: ADMINISTRATIVE | Facility: CLINIC | Age: 86
End: 2021-05-29

## 2021-05-31 VITALS — HEIGHT: 62 IN | WEIGHT: 144.3 LBS | BODY MASS INDEX: 26.55 KG/M2

## 2021-05-31 VITALS — WEIGHT: 144.25 LBS | BODY MASS INDEX: 26.38 KG/M2

## 2021-06-01 VITALS — BODY MASS INDEX: 23 KG/M2 | WEIGHT: 125 LBS | HEIGHT: 62 IN

## 2021-06-01 VITALS — WEIGHT: 131 LBS | HEIGHT: 62 IN | BODY MASS INDEX: 24.11 KG/M2

## 2021-06-01 VITALS — WEIGHT: 130.3 LBS | BODY MASS INDEX: 23.98 KG/M2 | HEIGHT: 62 IN

## 2021-06-01 VITALS — BODY MASS INDEX: 24.11 KG/M2 | WEIGHT: 131 LBS | HEIGHT: 62 IN

## 2021-06-01 VITALS
HEIGHT: 62 IN | BODY MASS INDEX: 26.87 KG/M2 | WEIGHT: 146 LBS | WEIGHT: 146 LBS | HEIGHT: 62 IN | BODY MASS INDEX: 26.87 KG/M2

## 2021-06-01 VITALS — WEIGHT: 130 LBS | BODY MASS INDEX: 23.92 KG/M2 | HEIGHT: 62 IN

## 2021-06-01 VITALS — BODY MASS INDEX: 25.42 KG/M2 | WEIGHT: 139 LBS

## 2021-06-01 VITALS — BODY MASS INDEX: 25.61 KG/M2 | WEIGHT: 140 LBS

## 2021-06-01 VITALS — WEIGHT: 121 LBS | HEIGHT: 62 IN | BODY MASS INDEX: 22.26 KG/M2

## 2021-06-08 NOTE — PROGRESS NOTES
Pt arrived [facility] with her son, for t&S, armband placed, labs drawn from rt ante ,double checked and labeled , RTc 1/19 for blood transfusion. Pt left [facility] at 1535  Jessica Ibrahim

## 2021-06-12 NOTE — PROGRESS NOTES
Pt arrived via the w/c for her t&s, armband placed, Labs drawn, Double checked and labeled, RTC 8/17 for blood transfusion  Jessica Ibrahim

## 2021-06-12 NOTE — PROGRESS NOTES
Pt arrived via the w/c with her grandson, for her blood transfusion, vss Iv started, pre meds given, Pt was transfused with 2 units of packed red blood cells, with lasix in between, Bp up at times, but stable, Iv was flushed with ns then dcd after. Pt given her AVS and left amb with her granddaughter at 1330  Jessica Ibrahim

## 2021-06-15 NOTE — ANESTHESIA CARE TRANSFER NOTE
Last vitals:   Vitals:    01/17/18 1437   BP: 135/60   Pulse: 72   Resp: 16   Temp: 36.7  C (98.1  F)   SpO2: 94%     Patient's level of consciousness is drowsy  Spontaneous respirations: yes  Maintains airway independently: yes  Dentition unchanged: yes  Oropharynx: oropharynx clear of all foreign objects    QCDR Measures:  ASA# 20 - Surgical Safety Checklist: WHO surgical safety checklist completed prior to induction  PQRS# 430 - Adult PONV Prevention: 4558F - Pt received => 2 anti-emetic agents (different classes) preop & intraop  ASA# 8 - Peds PONV Prevention: NA - Not pediatric patient, not GA or 2 or more risk factors NOT present  PQRS# 424 - Kim-op Temp Management: 4559F - At least one body temp DOCUMENTED => 35.5C or 95.9F within required timeframe  PQRS# 426 - PACU Transfer Protocol: - Transfer of care checklist used  ASA# 14 - Acute Post-op Pain: ASA14B - Patient did NOT experience pain >= 7 out of 10

## 2021-06-15 NOTE — ANESTHESIA PREPROCEDURE EVALUATION
Anesthesia Evaluation      Patient summary reviewed   No history of anesthetic complications     Airway   Mallampati: III  Neck ROM: full   Pulmonary - normal exam   (+) shortness of breath,                          Cardiovascular   (+) hypertension, dysrhythmias, CHF, , hypercholesterolemia,     Rhythm: regular  Rate: normal,    murmur Location:upper left sternal border      Neuro/Psych    (+) neuromuscular disease,      Endo/Other    (+) hypothyroidism,      GI/Hepatic/Renal    (+)   chronic renal disease CRI,      Other findings: CLL, immune thrombocytopenic purpura, hemolytic anemia + anemia of chronic disease, compensated CHF, h/o PAF, pericardial effusion. Albumin low and bilirubin 2.0.  H/o acute respiratory failure with hypoxia.  Weakness.  Candidate for TAVR.  States PERRY has improved since on low Na diet.  Echo 11/27/17:  Severe left atrial enlargement.  Moderate concentric left ventricular hypertrophy.  Left ventricle ejection fraction is normal. The calculated left ventricular ejection fraction is 59%.  Severe aortic valve stenosis with moderate regurgitation, moderate mitral regurgitation, mild to moderate tricuspid regurgitation with mild to moderate elevation in pulmonary artery pressures.  When compared to the previous study dated 6/10/2015, the mean gradient across aortic valve has increased further. There is now mild to moderate pulmonary hypertension.  Hg 8.2, K 3.9, Cr 1.89, GFR 33, BNP 2147.  Antibody screen blood bank is positive.       Dental - normal exam                        Anesthesia Plan  Planned anesthetic: MAC  Scheduled GEN, but suspect this is an error, with discuss with surgeon.  ASA 4     Anesthetic plan and risks discussed with: patient    Post-op plan: routine recovery

## 2021-06-15 NOTE — PROGRESS NOTES
Arrived via w/c accompanied by daughter for lab draw in prep for transfusion tomorrow.  Lab drawn without problem.  See LDA flow.  Nameband placed on pt and tubes labelled for blood blank.  Reminded pt to NOT remove name band as this is our identifier for tomorrow for transfusion.  Left via w/c accompanied by daughter at 1605.  Will return in am for transfusion.  Reminded to call in am before coming to make sure that blood is ready as she does have antibody.

## 2021-06-15 NOTE — PROGRESS NOTES
Patient here to be evaluated in valve clinic for her severe AS. STS 8.16%. She agrees to proceed with TAVR. Will obtain angio, CTA, surgery consult #2 and dental clearance. She was very anxious and nervous to go to the dentist. I told her and her family to call if there are any questions or concerns. She was accompanied by 3 children, 2 daughters and a son but she has an additional 3 other children not with her today.

## 2021-06-15 NOTE — PROGRESS NOTES
I saw Brandi Patiño today in valve clinic along with Dr. Pinto and Dr. Olivares.  Brandi is an 89-year-old female who comes in today with 2 of her daughters and 1 of her sons.    She was admitted at the end of November for shortness of breath and chest pain as well as anemia.  She received 2 units of packed red blood cells for her anemia and her hemoglobin came back to 8.8.  On admission she had echocardiogram which showed severe aortic stenosis with valve area 0.8 cm  and mean gradient 94 mmHg.  Normal ejection fraction.  Normal kidneys.  She has never undergone cardiac catheterization.    In addition to her aortic stenosis, she also had a brief episode of paroxysmal atrial fibrillation but was not started on anticoagulation even though [id number] score was 5 because of her history of CLL and anemia.  She also has hypertension, hyperlipidemia, hypothyroidism and recent UTI.    Given her age and elevated STS risk score around 8%, she is a good candidate for TAVR (transcatheter aortic valve replacement) and we will start workup.  Please see Dr. Pinto and Dr. Olivares's full consult notes for details on discussion during today's visit    Jyoti Velarde PA-C, MPAS  Structural Heart Program   [facility]

## 2021-06-15 NOTE — ANESTHESIA POSTPROCEDURE EVALUATION
Patient: Brandi Patiño  COLONOSCOPY WITH CECUM AND RECTUM BIOPSY  Anesthesia type: MAC    Patient location: ROHIT phase 2  Last vitals:   Vitals:    01/17/18 1437   BP: 135/60   Pulse: 72   Resp: 16   Temp: 36.7  C (98.1  F)   SpO2: 94%   Case done MAC.  Post vital signs: stable  Level of consciousness: alert and conversant  Post-anesthesia pain: pain controlled  Post-anesthesia nausea and vomiting: no  Pulmonary: unassisted, return to baseline  Cardiovascular: stable and blood pressure at baseline  Hydration: adequate  Anesthetic events: no    QCDR Measures:  ASA# 11 - Kim-op Cardiac Arrest: ASA11B - Patient did NOT experience unanticipated cardiac arrest  ASA# 12 - Kim-op Mortality Rate: ASA12B - Patient did NOT die  ASA# 13 - PACU Re-Intubation Rate: ASA13B - Patient did NOT require a new airway mgmt  ASA# 10 - Composite Anes Safety: ASA10A - No serious adverse event    Additional Notes:

## 2021-06-15 NOTE — PROGRESS NOTES
[facility] Heart Care Note    Assessment / Plan:    Ms Patiño is becoming increasingly symptomatic from her very severe aortic stenosis, including a recent hospitalization for congestive heart failure.    She will therefore benefit from aortic valve replacement.  Given her age and STS score of 8%, she would be at high risk for surgical valve replacement, and will therefore be a good candidate for transcatheter approach.      The risks, benefits and alternatives of transcatheter aortic valve replacement were reviewed in detail, and she would like to proceed.    She will be scheduled for a coronary angiogram as well as a CTA to help plan the procedure, and in the interim knows to avoid heavy exertion and also to call if there is any change in condition or increasing symptoms.    Thank you for the opportunity to participate in the care of Brandi Patiño. Please do not hesitate to call with any questions or concerns regarding her cardiovascular status.    ______________________________________________________________________    Subjective:    It was a pleasure to see Brandi Patiño at the [facility] at the request of Dr Miles and Dr Loyd for evaluation of treatment options for severe aortic stenosis.     Brandi Patiño is a pleasant 89 y.o. female who is been known to have significant aortic stenosis in the past, as well as a history of CLL, hypertension and diastolic heart failure.  More recently she has noticed increasing shortness of breath leading to a declining functional capacity.  She was hospitalized after Thanksgiving with an episode of congestive heart failure.    An echocardiogram done during that hospitalization showed very severe aortic stenosis with a mean gradient of 94 mmHg, peak velocity of 5.4 m/s, and an aortic valve area of 0.8 cm  with normal left ventricular systolic function.  ______________________________________________________________________    Problem  List:  Patient Active Problem List   Diagnosis     Fracture dislocation of ankle joint     CLL (chronic lymphocytic leukemia)     Essential hypertension     Heart murmur     Severe aortic stenosis     Mitral stenosis     Anemia     HOLLEY (acute kidney injury)     Diastolic heart failure     Pericardial effusion     Symptomatic anemia     Tinnitus, bilateral     Weakness generalized     Hypothyroidism     CHF exacerbation     Anemia, unspecified type     Acute respiratory failure with hypoxia     Weakness     Acute on chronic diastolic congestive heart failure     Dysuria     Urinary tract infection without hematuria, site unspecified     Paroxysmal atrial fibrillation     Heart failure with preserved ejection fraction       Medical History:  Past Medical History:   Diagnosis Date     Adenoma      Anemia 6/8/2015     CHF (congestive heart failure)      CLL (chronic lymphocytic leukemia)      CLL (chronic lymphocytic leukemia)      Disease of thyroid gland      Heart murmur 11/19/2014     Hypertension        Surgical History:  Past Surgical History:   Procedure Laterality Date     APPENDECTOMY       COLECTOMY N/A      FRACTURE SURGERY       HYSTERECTOMY       ORIF ANKLE FRACTURE Left 11/17/2014    Procedure: LEFT ANKLE FRACTURE OPEN REDUCTION INTERNAL FIXATION;  Surgeon: Luis Blount DPM;  Location: [facility];  Service:      TONSILLECTOMY         Social History:  Social History     Social History     Marital status:      Spouse name: N/A     Number of children: N/A     Years of education: N/A     Occupational History     Not on file.     Social History Main Topics     Smoking status: Former Smoker     Years: 20.00     Quit date: 11/17/2014     Smokeless tobacco: Never Used     Alcohol use No     Drug use: No     Sexual activity: No     Other Topics Concern     Not on file     Social History Narrative       Review of Systems: 12 organ system review done and negative except as noted in the  "HPI.    Family History:  Family History   Problem Relation Age of Onset     No Medical Problems Mother      No Medical Problems Father          Allergies:  Allergies   Allergen Reactions     Blood-Group Specific Substance Unknown     Anti-E and Warm autoantibodies present. Expect delays in blood for transfusion up to 24 hours.   Draw 2 lavender and 1 red tube for type and screen orders.     Percocet [Oxycodone-Acetaminophen] Other (See Comments)     Hallucinations.  Can take plain acetaminophen.     Vasotec [Enalaprilat] Cough       Medications:  Current Outpatient Prescriptions   Medication Sig Dispense Refill     acetaminophen (TYLENOL EXTRA STRENGTH) 500 MG tablet Take 500 mg by mouth every 4 (four) hours as needed for pain.       diltiazem (CARDIZEM CD) 300 MG 24 hr capsule Take 300 mg by mouth daily.       furosemide (LASIX) 20 MG tablet Take 2 tablets (40 mg total) by mouth daily. 60 tablet 0     ibrutinib (IMBRUVICA) 140 mg cap capsule Take 140 mg by mouth at bedtime.        levothyroxine (SYNTHROID, LEVOTHROID) 125 MCG tablet Take 125 mcg by mouth daily.        multivitamin with minerals (THERA-M) 9 mg iron-400 mcg Tab tablet Take 1 tablet by mouth daily.       potassium chloride (KLOR-CON) 10 MEQ CR tablet Take 10 mEq by mouth daily.       predniSONE (DELTASONE) 10 mg tablet Take 10 mg by mouth daily.       clonazePAM (KLONOPIN) 0.5 MG tablet TAKE 1-2 TABS BY MOUTH DAILY AT BEDTIME AS NEEDED FOR SLEEP.  3     No current facility-administered medications for this visit.        Objective:   Vital signs:  /50 (Patient Site: Left Arm, Patient Position: Sitting, Cuff Size: Adult Regular)  Pulse 76  Resp 16  Ht 5' 2\" (1.575 m)  Wt 144 lb 4.8 oz (65.5 kg)  BMI 26.39 kg/m2      Physical Exam:    GENERAL APPEARANCE: Alert, cooperative and in no acute distress.  HEENT: No scleral icterus. No Xanthelasma. Oral mucuos membranes pink and moist.  NECK: No JVD. Thyroid not visualized  CHEST: clear to " auscultation  CARDIOVASCULAR: S1, S2 regular. 3/6 OTIS   PULSES: Radial and posterior tibial pulses are intact and symmetric.   ABDOMEN: Nontender. BS+.   EXTREMITIES: No cyanosis, clubbing or edema.  SKIN: Warm, well perfused  NEURO: Grossly nonfocal    Lab Results:  LIPIDS:  Lab Results   Component Value Date    CHOL 199 06/29/2016    CHOL 208 (H) 11/20/2015    CHOL 239 (H) 09/16/2014     Lab Results   Component Value Date    HDL 65 06/29/2016    HDL 44 (L) 11/20/2015    HDL 36 (L) 09/16/2014     Lab Results   Component Value Date    LDLCALC 113 06/29/2016    LDLCALC 130 (H) 11/20/2015    LDLCALC  09/16/2014      Comment:      Invalid, Triglycerides >300     Lab Results   Component Value Date    TRIG 104 06/29/2016    TRIG 168 (H) 11/20/2015    TRIG 318 (H) 09/16/2014     No components found for: CHOLHDL    BMP:  Lab Results   Component Value Date    CREATININE 1.49 (H) 12/28/2017    BUN 35 (H) 12/28/2017     12/28/2017    K 4.2 12/28/2017     12/28/2017    CO2 23 12/28/2017         ECG and echo films independently reviewed      NATANAEL ANN MD  [facility]

## 2021-06-15 NOTE — PROGRESS NOTES
Cardiothoracic Surgery Consult    Date of Service: 12/28/2017    REFERRING CARDIOLOGIST: Dr. Ioana Pinto.    REASON FOR CONSULTATION: Ms. Patiño is an 89 year-old woman with symptomatic severe aortic stenosis.     HISTORY OF PRESENT ILLNESS: Ms. Patiño is an 89 year-old woman with progressively worsening dyspnea with minimal exertion. Echocardiography demonstrates a severely calcified aortic valve with severe aortic stenosis. She denies orthopnea, paroxysmal nocturnal dyspnea, or lower extremity edema. She has not had any presyncope or syncope. She was recently hospitalized with heart failure. She denies chest pain. She lives independently but her quality of life has been compromised due to the aortic stenosis.     PAST MEDICAL HISTORY:   Past Medical History:   Diagnosis Date     Adenoma      Anemia 6/8/2015     CHF (congestive heart failure)      CLL (chronic lymphocytic leukemia)      CLL (chronic lymphocytic leukemia)      Disease of thyroid gland      Heart murmur 11/19/2014     Hypertension        PAST SURGICAL HISTORY:   Past Surgical History:   Procedure Laterality Date     APPENDECTOMY       COLECTOMY N/A      FRACTURE SURGERY       HYSTERECTOMY       ORIF ANKLE FRACTURE Left 11/17/2014    Procedure: LEFT ANKLE FRACTURE OPEN REDUCTION INTERNAL FIXATION;  Surgeon: Luis Blount DPM;  Location: [facility] [place], [place];  Service:      TONSILLECTOMY         ALLERGIES:   Allergies   Allergen Reactions     Blood-Group Specific Substance Unknown     Anti-E and Warm autoantibodies present. Expect delays in blood for transfusion up to 24 hours.   Draw 2 lavender and 1 red tube for type and screen orders.     Percocet [Oxycodone-Acetaminophen] Other (See Comments)     Hallucinations.  Can take plain acetaminophen.     Vasotec [Enalaprilat] Cough          CURRENT MEDICATIONS:  Current Outpatient Prescriptions on File Prior to Visit   Medication Sig Dispense Refill     acetaminophen (TYLENOL EXTRA  STRENGTH) 500 MG tablet Take 500 mg by mouth every 4 (four) hours as needed for pain.       clonazePAM (KLONOPIN) 0.5 MG tablet TAKE 1-2 TABS BY MOUTH DAILY AT BEDTIME AS NEEDED FOR SLEEP.  3     diltiazem (CARDIZEM CD) 300 MG 24 hr capsule Take 300 mg by mouth daily.       furosemide (LASIX) 20 MG tablet Take 2 tablets (40 mg total) by mouth daily. 60 tablet 0     ibrutinib (IMBRUVICA) 140 mg cap capsule Take 140 mg by mouth at bedtime.        levothyroxine (SYNTHROID, LEVOTHROID) 125 MCG tablet Take 125 mcg by mouth daily.        multivitamin with minerals (THERA-M) 9 mg iron-400 mcg Tab tablet Take 1 tablet by mouth daily.       potassium chloride (KLOR-CON) 10 MEQ CR tablet Take 10 mEq by mouth daily.       predniSONE (DELTASONE) 10 mg tablet Take 10 mg by mouth daily.       No current facility-administered medications on file prior to visit.          FAMILY HISTORY:   Family History   Problem Relation Age of Onset     No Medical Problems Mother      No Medical Problems Father        SOCIAL HISTORY:   Social History     Social History     Marital status:      Spouse name: N/A     Number of children: N/A     Years of education: N/A     Occupational History     Not on file.     Social History Main Topics     Smoking status: Former Smoker     Years: 20.00     Quit date: 11/17/2014     Smokeless tobacco: Never Used     Alcohol use No     Drug use: No     Sexual activity: No     Other Topics Concern     Not on file     Social History Narrative       REVIEW OF SYSTEMS:  A complete 10 point review of systems was obtained and is negative other than the above stated complaints    PHYSICAL EXAMINATION:   Vitals: There were no vitals taken for this visit.  GENERAL: Elderly and frail  HEENT: Normocephalic, , and conjunctiva anicteric and sclera clear   NECK: negative findings: no asymmetry, masses, or scars  CARDIOVASCULAR: Regular rate and rhythm  RESPIRATIONS: no tachypnea, retractions or cyanosis  ABDOMEN: normal  findings: soft, non-tender  EXTREMITIES:negative; no deformity or swelling   NEUROLOGIC: intact and symmetric with no focal deficits.   PSYCHIATRIC: alert and oriented x3, pleasant       LABORATORY STUDIES:   Lab Results   Component Value Date    WBC 57.0 (HH) 12/28/2017    HGB 9.5 (L) 12/28/2017    HCT 31.5 (L) 12/28/2017     (H) 12/28/2017     12/28/2017      Lab Results   Component Value Date    CREATININE 1.49 (H) 12/28/2017    BUN 35 (H) 12/28/2017     12/28/2017    K 4.2 12/28/2017     12/28/2017    CO2 23 12/28/2017     No results found for: HGBA1C  EKG:  Normal sinus rhythm   Possible Left atrial enlargement   Left ventricular hypertrophy   Nonspecific T wave abnormality   Abnormal ECG   When compared with ECG of 27-NOV-2017 06:41,   Nonspecific T wave abnormality, improved in Lateral leads       TRANSTHORACIC ECHOCARDIOGRAM: This study was personally reviewed by me    Severe left atrial enlargement    Moderate concentric left ventricular hypertrophy    Left ventricle ejection fraction is normal. The calculated left ventricular ejection fraction is 59%.    Severe aortic valve stenosis with moderate regurgitation    Moderate mitral regurgitation    Mild to moderate tricuspid regurgitation with mild to moderate elevation in pulmonary artery pressures.    When compared to the previous study dated 6/10/2015, the mean gradient across aortic valve has increased further. There is now mild to moderate pulmonary hypertension.    IMPRESSION AND PLAN: Ms. Patiño is an 89 year-old woman with symptomatic severe aortic stenosis. I recommend aortic valve replacement. She understands the technical details of open surgical AVR, as well as the expected postoperative course and recovery. The risks include but are not limited to bleeding, infection, stroke, heart or graft failure, dysrhythmia, respiratory failure, kidney or liver injury, bowel or limb ischemia, and death. Her calculated STS risk for  mortality with isolated aortic valve replacement is 8%. Because she has high operative risk with open surgical AVR, I do not recommend this. Instead, I recommend transcatheter aortic valve replacement. We discussed the technical details of this with her as well. She understands that there is a risk of bleeding, infection, stroke, heart block requiring permanent pacemaker, cardiac perforation, aortic root rupture and aortic dissection, in addition to risk of death. She wishes to proceed with scheduling for TAVR.      Thank you very much for this referral.       Rocco Olivares 12/28/2017 2:51 PM

## 2021-06-15 NOTE — PROGRESS NOTES
"Arrived for transfusion of 2 units PRBC's.  States she has \"been feeling ok\", but is having colonoscopy tomorrow and needs transfusion for that to be done.  Denies any obvious bleeding in urine or stools.  Given 2 units PRBC's with lasix between units.  Up to BR X 2 to void after lasix given.  Tolerated transfusion well while here.  VSS  No evidence of reaction while here.  Declined any printed info on transfusions.  Has had many transfusions in past and feels comfortable with info she has.  Left ambulatory with son at 1450.  Will follow up with MD as directed  "

## 2021-06-16 PROBLEM — Z51.5 ENCOUNTER FOR PALLIATIVE CARE: Status: ACTIVE | Noted: 2018-01-01

## 2021-06-16 PROBLEM — I50.32 CHRONIC DIASTOLIC CONGESTIVE HEART FAILURE (H): Status: ACTIVE | Noted: 2018-01-01

## 2021-06-16 PROBLEM — R91.8 PULMONARY NODULES: Status: ACTIVE | Noted: 2018-01-01

## 2021-06-16 PROBLEM — D64.9 ANEMIA: Status: ACTIVE | Noted: 2018-01-01

## 2021-06-16 PROBLEM — E79.0 ELEVATED URIC ACID IN BLOOD: Status: ACTIVE | Noted: 2018-01-01

## 2021-06-16 PROBLEM — R19.7 DIARRHEA: Status: ACTIVE | Noted: 2018-01-01

## 2021-06-16 PROBLEM — Z95.2 S/P TAVR (TRANSCATHETER AORTIC VALVE REPLACEMENT): Chronic | Status: ACTIVE | Noted: 2018-01-01

## 2021-06-16 PROBLEM — I50.9 CHF (CONGESTIVE HEART FAILURE) (H): Status: ACTIVE | Noted: 2018-01-01

## 2021-06-16 PROBLEM — E43 SEVERE PROTEIN-CALORIE MALNUTRITION (H): Status: ACTIVE | Noted: 2018-01-01

## 2021-06-16 NOTE — PROGRESS NOTES
Pt arrived  amb with her son, for her blood transfusion, Armband on, Went over today plan of care, questions regarding blood transfusion and side effects. Iv started in lt arm, tylenol given, as a pre med, vss PT transfused with 2 units of packed red blood cells, lasix  given inbetween,  Voided times 2, IV was flushed with ns then dcdfter, Went over the AVS and post transfusion reactions Pt left amb at 1410 with her son in law  Jessica Ibrahim

## 2021-06-16 NOTE — PROGRESS NOTES
Pt arrived via the w/c with her son, armband placed, went over todays plan of care , 23g butterfly started in lt ante, labs drawn double checked and labeled. RTC Friday, pt left via the w/c with her son at 1520  Jessica Ibrahim

## 2021-06-17 NOTE — PROGRESS NOTES
Brandi Patiño arrived for one unit packed red blood cell transfusion. Lab results were  reviewed as required. Hgb 7.3, Plt 282 on 04/18/18.  Brandi Patiño was educated on her plan of care. Brandi was premedicated with Acetaminophen 650 mg orally at 1011 prior to transfusion. She had also taken Acetaminophen at home at 0600 this morning for pain as she had 2 teeth extracted yesterday. She has some bruising on her face and both of her arms and upper chest area. Each medication and unit of packed red blood cells given today was reviewed prior to administration. Lasix 10 mg IV was given x 1 after transfusion was completed. She voided prior to discharge. Blood transfusion treatment was completed with no signs of reaction. IV site:peripheral IV patent throughout treatment with positive blood return obtained before and at completion. IV site with no pain, redness, swelling and drainage. IV site flushed with saline and discontinued. Brandi Patiño will follow up at [facility]. Brandi Patiño was discharged to home. She discharged from unit per wheelchair by Anabel, her caregiver today at 1312. The after visit summary was given. Post transfusion information was reviewed with Brandi.    Maya Aviles

## 2021-06-17 NOTE — PROGRESS NOTES
Pt admitted per pedis for her type and cross and will receive 2uprbcs tomarrow. Pt states she usually gets her blood at [facility] but they were busy and couldn't schedule her for fri. Call received from blood bank stating we need 4 more tubes of blood due to antibiodies. Lab will notify pt and set up phlebotomy over at [facility] since pt lives that direction.

## 2021-06-17 NOTE — PROGRESS NOTES
Pt arrived via the w/c with her daughter, armband placed, Labs drawn from rt ante, Blood tubes double checked and labeled Pt left via the w/c with her daughter at 1435, .hgb 7.2 to to get 2 units of packed red blood cells, per MD new parameters, Pt was called, To leave armband on ,  Jessica Ibrahim

## 2021-06-17 NOTE — PROGRESS NOTES
Patient arrived ambulatory accompanied by her daughter. IV started with difficulty, attempted once by this writer and 2 attempts by IV RN with ultrasound. One hour after blood started IV infiltrated with bruising and swelling noted at site. Ice applied to site. IV started in right AC and blood transfusion restarted. Pt's vital signs stable throughout transfusions and no transfusion reactions were noted. Pt was premedicated with acetaminophen and given Lasix after first transfusion completed. Upon completion of blood IV was dc'd and site wrapped with coban. Pt and son given verbal and written discharge transfusion instructions, both acknowledge understanding of teaching. Pt will follow up with Dr Vo next Wednesday. Pt left ambulatory assisted by her son, destination dentist appt in [place].

## 2021-06-17 NOTE — PROGRESS NOTES
Pt arrived via the w/c for her t&s, armband placed, labs drawn, double checked and labeled, rtc 4/20 for her blood transfusion, Pt left via the w/c with her daughter at 1145Pt was just in the hospital for CHF  Jessica Ibrahim

## 2021-06-18 NOTE — ANESTHESIA POSTPROCEDURE EVALUATION
Patient: Brandi Patiño  Transfemoral Transcatheter Aortic Valve Replacement - General Anesthesia whole case, H&P done, paramjit-Azeb, no device, perfussion booked, Dr. Knott non invasive, TRANSFEMORAL TRANSCATHETER AORTIC VALVE REPLACEMENT (STANDBY)  Anesthesia type: general    Patient location: PACU  Last vitals:   Vitals:    06/13/18 0630   BP: 145/61   Pulse: 62   Resp: 19   Temp:    SpO2: 95%     Post vital signs: stable  Level of consciousness: awake and responds to simple questions  Post-anesthesia pain: pain controlled  Post-anesthesia nausea and vomiting: no  Pulmonary: unassisted, return to baseline  Cardiovascular: stable and blood pressure at baseline  Hydration: adequate  Anesthetic events: no    QCDR Measures:  ASA# 11 - Kim-op Cardiac Arrest: ASA11B - Patient did NOT experience unanticipated cardiac arrest  ASA# 12 - Kim-op Mortality Rate: ASA12B - Patient did NOT die  ASA# 13 - PACU Re-Intubation Rate: ASA13B - Patient did NOT require a new airway mgmt  ASA# 10 - Composite Anes Safety: ASA10A - No serious adverse event    Additional Notes: On oxygen therapy by nasal cannula but otherwise stable.  Overall feeling well this AM.

## 2021-06-18 NOTE — PROGRESS NOTES
Pt arrived via the w/c with her son, armband placed, labs drawn from rt ante, blood tubes double checked and labeled, I will call  the pt when to results when they return, hgb 7.2 , to to get 2 units of blood tomorrow  Jessica Ibrahim

## 2021-06-18 NOTE — PROGRESS NOTES
Code Status:  UNKNOWN  Visit Type: Problem Visit     Facility:  [facility] [[id number]]         Facility Type: SNF (Skilled Nursing Facility, TCU)    History of Present Illness: Brandi Patiño is a 89 y.o. female when seen today at the request the nursing staff.  Patient with recent critical laboratory.  Patient recently admitted to the hospital for elective transcatheter aortic valve replacement on 6/12/2018.  She has underlying severe aortic stenosis.  She was previously in the TCU for pneumonia.  She also has a history of CHF.  Posterior Soham procedure have postoperative anemia.  She did undergo a total of 3 units of packed red blood cells.  Her hemoglobin initially rebounded to 9.6 but has been drifting downward.  Previously was 7.8 and today 6.5.  She has underlying CLL.  She is followed by Dr. Lockhart for oncology.  Her white blood cell count is 186.  However this is down from 221.  She had a follow-up with Dr. Lockhart today.  And was scheduled for transfusion tomorrow.  I did update him on laboratory and he wanted her sent to the hospital.  This was reviewed with the patient and her daughter Cherry.  Patient does continue on Plavix and aspirin.  CHF appears compensated.  Patient does report feelings of tiredness and weakness.  Hemodynamically stable.  No tachycardia.    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      Pneumonia of right lung due to infectious organism, unspecified part of lung      Persistent atrial fibrillation (H)      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     Resolved Ambulatory Problems     Diagnosis Date Noted     HOLLEY (acute kidney injury) (H) 06/08/2015     Diastolic heart failure (H) 06/08/2015     Dyspnea 06/08/2015     Acute respiratory failure (H) 06/08/2015     Pericardial effusion 06/10/2015     Symptomatic anemia 02/21/2016     Tinnitus, bilateral      Weakness generalized      Anemia, unspecified type      Accelerated hypertension      Weakness      Acute on chronic diastolic congestive heart failure (H)      Dysuria      Urinary tract infection without hematuria, site unspecified      Paroxysmal atrial fibrillation (H)      Troponin level elevated 04/02/2018     Pulmonary edema 05/25/2018     Pulmonary infiltrate      Past Medical History:   Diagnosis Date     Acute non-ST elevation myocardial infarction (NSTEMI) (H) 03/29/2018     Anemia 6/8/2015     Chest pain with normal coronary angiography 04/02/2018     Chronic diastolic heart failure due to valvular disease (H)      CLL (chronic lymphocytic leukemia) (H)      DNAR (do not attempt resuscitation)      Essential hypertension      Fracture dislocation of ankle joint 11/16/2014     Hypothyroidism      Severe aortic stenosis        No current outpatient prescriptions on file.       Allergies   Allergen Reactions     Blood-Group Specific Substance Unknown     Anti-E and Warm autoantibodies present. Expect delays in blood for transfusion up to 24 hours.   Draw 2 lavender and 1 red tube for type and screen orders.     Oxycodone Other (See Comments)     Hallucinations     Vasotec [Enalaprilat] Cough         Review of Systems   No fevers or chills. No headache, lightheadedness or dizziness.  She does report feelings of tiredness and weakness.  Occasional SOB, no chest pains or palpitations. Appetite is poor.  No nausea, vomiting, constipation or diarrhea. No dysuria, frequency, burning or pain with urination.    Physical Exam   PHYSICAL EXAMINATION:  Vital signs: BP  135/56, heart rate 65, respirations 20, O2 sat 91% on room air, temperature 98.1.  Current weight 130.2 pounds.  General: Awake, Alert, oriented x3, appropriately, follows simple commands, conversant.  Appears overall fatigue.   HEENT:PERRLA, Pink conjunctiva, anicteric sclerae, moist oral mucosa  NECK: Supple, without any lymphadenopathy, or masses  CVS:  S1  S2, without murmur or gallop.   LUNG: Clear to auscultation, No wheezes, rales or rhonci.  BACK: No kyphosis of the thoracic spine  ABDOMEN: Soft, nontender to palpation, with positive bowel sounds  EXTREMITIES: Good range of motion on both upper and lower extremities, 1+ pedal edema, no calf tenderness  SKIN: Warm and dry, no rashes or erythema noted  NEUROLOGIC: Intact, pulses palpable  PSYCHIATRIC: Cognition intact            Labs:    Recent Results (from the past 240 hour(s))   Hemoglobin   Result Value Ref Range    Hemoglobin 7.6 (L) 12.0 - 16.0 g/dL   BNP(B-type Natriuretic Peptide)   Result Value Ref Range    BNP 2528 (H) 0 - 167 pg/mL   Comprehensive Metabolic Panel   Result Value Ref Range    Sodium 135 (L) 136 - 145 mmol/L    Potassium 4.4 3.5 - 5.0 mmol/L    Chloride 97 (L) 98 - 107 mmol/L    CO2 23 22 - 31 mmol/L    Anion Gap, Calculation 15 5 - 18 mmol/L    Glucose 162 (H) 70 - 125 mg/dL    BUN 72 (H) 8 - 28 mg/dL    Creatinine 1.47 (H) 0.60 - 1.10 mg/dL    GFR MDRD Af Amer 41 (L) >60 mL/min/1.73m2    GFR MDRD Non Af Amer 33 (L) >60 mL/min/1.73m2    Bilirubin, Total 0.6 0.0 - 1.0 mg/dL    Calcium 8.6 8.5 - 10.5 mg/dL    Protein, Total 5.7 (L) 6.0 - 8.0 g/dL    Albumin 3.1 (L) 3.5 - 5.0 g/dL    Alkaline Phosphatase 84 45 - 120 U/L    AST 21 0 - 40 U/L    ALT 19 0 - 45 U/L   HM1 (CBC with Diff)   Result Value Ref Range    .2 (HH) 4.0 - 11.0 thou/uL    RBC 2.78 (L) 3.80 - 5.40 mill/uL    Hemoglobin 8.1 (L) 12.0 - 16.0 g/dL    Hematocrit 26.9 (L) 35.0 - 47.0 %    MCV 97 80 - 100 fL    MCH 29.1 27.0 - 34.0 pg    MCHC 30.1 (L) 32.0 - 36.0 g/dL     RDW 20.0 (H) 11.0 - 14.5 %    Platelets 143 140 - 440 thou/uL    MPV 11.7 8.5 - 12.5 fL   Type and Screen   Result Value Ref Range    ABORh O POS     Antibody Screen Negative Negative   INR   Result Value Ref Range    INR 1.05 0.90 - 1.10   Culture, MRSA (ID Only)   Result Value Ref Range    Culture MRSA Coagulase Positive Staph (!)    Manual Differential   Result Value Ref Range    Total Neutrophils % 16 (L) 50 - 70 %    Lymphocytes % 83 (H) 20 - 40 %    Monocytes % 1 (L) 2 - 10 %    Eosinophils %  0 0 - 6 %    Basophils % 0 0 - 2 %    Metamyelocytes % 1 <=1 %    Total Neutrophils Absolute 31.2 (H) 2.0 - 7.7 thou/ul    Lymphocytes Absolute 161.0 (H) 0.8 - 4.4 thou/uL    Monocytes Absolute 2.0 (H) 0.0 - 0.9 thou/uL    Eosinophils Absolute 0.0 0.0 - 0.4 thou/uL    Basophils Absolute 0.0 0.0 - 0.2 thou/uL    Metamyelocytes Absolute 1.0 (H) <=0.1 thou/uL    Smudge Cells Present (!) None Seen    Platelet Estimate Normal Normal    Ovalocytes 1+ (!) Negative    Polychromasia 1+ (!) Negative   ECG 12 lead with MUSE   Result Value Ref Range    SYSTOLIC BLOOD PRESSURE  mmHg    DIASTOLIC BLOOD PRESSURE  mmHg    VENTRICULAR RATE 75 BPM    ATRIAL RATE 75 BPM    P-R INTERVAL 134 ms    QRS DURATION 150 ms    Q-T INTERVAL 450 ms    QTC CALCULATION (BEZET) 502 ms    P Axis  degrees    R AXIS -35 degrees    T AXIS 134 degrees    MUSE DIAGNOSIS       Sinus rhythm with Premature atrial complexes with Aberrant conduction  Left axis deviation  Left bundle branch block  Abnormal ECG  When compared with ECG of 28-MAY-2018 07:37,  Sinus rhythm has replaced Atrial fibrillation  Confirmed by ALBA VENTURA MD LOC: ([id number]) on 6/11/2018 3:10:09 PM     Echo LYNSEY Structural Guidance   Result Value Ref Range    AV mean lissette 123 cm/s    AV mean gradient 8 mmHg    AV VTI 38.6 cm    AV peak lissette 217 cm/s    LVOT diam 2.1 cm    LVOT mean gradient 3 mmHg    LVOT peak VTI 24.4 cm    LVOT mean lissette 78.3 cm/s    LVOT peak lissette 144 cm/s    LVOT peak  gradient 8.0 mmHg    MV mean lissette 91.7 cm/s    MV mean gradient 4 mmHg    MV VTI 45.5 cm    MV peak velocityoctiy 182 cm/s    BSA 1.61 m2    Hieght 62 in    Weight 2099.2 lbs    HR 67 bpm    AV area 2.2 cm2    AV DIM IND lissette 0.7     MV area cont eq 1.9 cm2    LVOT area 3.46 cm2    LVOT SV 84.5 cm3    AV peak gradient 18.8 mmHg    MV peak gradient 13.2 mmHg    LV SVi 52.5 ml/m2    LV CO 5.7 l/min    LV Ci 3.5 l/min/m2    Height 62.0 in    Weight 131 lbs    AV DIM IND VTI 0.6     MVA VTI 1.86 cm2    Echo LVEF Estimated 55 %   INR   Result Value Ref Range    INR 1.26 (H) 0.90 - 1.10   APTT(PTT)   Result Value Ref Range    PTT 34 24 - 37 seconds   Comprehensive Metabolic Panel   Result Value Ref Range    Sodium 136 136 - 145 mmol/L    Potassium 3.1 (L) 3.5 - 5.0 mmol/L    Chloride 89 (L) 98 - 107 mmol/L    CO2 21 (L) 22 - 31 mmol/L    Anion Gap, Calculation 26 (H) 5 - 18 mmol/L    Glucose 144 (H) 70 - 125 mg/dL    BUN 64 (H) 8 - 28 mg/dL    Creatinine 1.36 (H) 0.60 - 1.10 mg/dL    GFR MDRD Af Amer 44 (L) >60 mL/min/1.73m2    GFR MDRD Non Af Amer 37 (L) >60 mL/min/1.73m2    Bilirubin, Total 0.4 0.0 - 1.0 mg/dL    Calcium 7.0 (L) 8.5 - 10.5 mg/dL    Protein, Total 4.1 (L) 6.0 - 8.0 g/dL    Albumin 2.5 (L) 3.5 - 5.0 g/dL    Alkaline Phosphatase 77 45 - 120 U/L    AST 33 0 - 40 U/L    ALT 26 0 - 45 U/L   Magnesium   Result Value Ref Range    Magnesium 1.9 1.8 - 2.6 mg/dL   HM1 (CBC with Diff)   Result Value Ref Range    .6 (HH) 4.0 - 11.0 thou/uL    RBC 2.03 (L) 3.80 - 5.40 mill/uL    Hemoglobin 6.0 (LL) 12.0 - 16.0 g/dL    Hematocrit 19.3 (L) 35.0 - 47.0 %    MCV 95 80 - 100 fL    MCH 29.6 27.0 - 34.0 pg    MCHC 31.1 (L) 32.0 - 36.0 g/dL    RDW 20.7 (H) 11.0 - 14.5 %    Platelets 107 (L) 140 - 440 thou/uL    MPV 11.0 8.5 - 12.5 fL   Manual Differential   Result Value Ref Range    Total Neutrophils % 4 (L) 50 - 70 %    Lymphocytes % 93 (H) 20 - 40 %    Monocytes % 2 2 - 10 %    Eosinophils %  0 0 - 6 %    Basophils %  0 0 - 2 %    Myelocytes % 1 <=1 %    Total Neutrophils Absolute 6.7 2.0 - 7.7 thou/ul    Lymphocytes Absolute 155.9 (H) 0.8 - 4.4 thou/uL    Monocytes Absolute 3.4 (H) 0.0 - 0.9 thou/uL    Eosinophils Absolute 0.0 0.0 - 0.4 thou/uL    Basophils Absolute 0.0 0.0 - 0.2 thou/uL    Myelocytes Absolute 1.7 (H) <=0.1 thou/uL    Smudge Cells Present (!) None Seen    Reactive Lymphocytes  Negative    Toxic Granulation 1+ (!) Negative    Platelet Estimate Decreased (!) Normal    Ovalocytes 1+ (!) Negative    Polychromasia 1+ (!) Negative   EKG 12 lead - STAT   Result Value Ref Range    SYSTOLIC BLOOD PRESSURE  mmHg    DIASTOLIC BLOOD PRESSURE  mmHg    VENTRICULAR RATE 57 BPM    ATRIAL RATE 57 BPM    P-R INTERVAL 184 ms    QRS DURATION 148 ms    Q-T INTERVAL 526 ms    QTC CALCULATION (BEZET) 511 ms    P Axis 63 degrees    R AXIS -45 degrees    T AXIS 127 degrees    MUSE DIAGNOSIS       Sinus bradycardia  Possible Left atrial enlargement  Left axis deviation  Left bundle branch block  Abnormal ECG  When compared with ECG of 11-JUN-2018 10:47,  Premature ventricular complexes is no longer Present  T wave inversion more evident in Lateral leads  Confirmed by ABIEL BE MD LOC: ([id number]) on 6/13/2018 4:54:28 PM     POCT Glucose   Result Value Ref Range    Glucose,  mg/dL   Hemogram   Result Value Ref Range    .2 (HH) 4.0 - 11.0 thou/uL    RBC 2.89 (L) 3.80 - 5.40 mill/uL    Hemoglobin 8.5 (L) 12.0 - 16.0 g/dL    Hematocrit 26.6 (L) 35.0 - 47.0 %    MCV 92 80 - 100 fL    MCH 29.4 27.0 - 34.0 pg    MCHC 32.0 32.0 - 36.0 g/dL    RDW 19.4 (H) 11.0 - 14.5 %    Platelets 89 (L) 140 - 440 thou/uL    MPV 11.2 8.5 - 12.5 fL   Basic Metabolic Panel   Result Value Ref Range    Sodium 132 (L) 136 - 145 mmol/L    Potassium 4.2 3.5 - 5.0 mmol/L    Chloride 106 98 - 107 mmol/L    CO2 19 (L) 22 - 31 mmol/L    Anion Gap, Calculation 7 5 - 18 mmol/L    Glucose 79 70 - 125 mg/dL    Calcium 7.3 (L) 8.5 - 10.5 mg/dL    BUN 64 (H) 8 -  28 mg/dL    Creatinine 1.28 (H) 0.60 - 1.10 mg/dL    GFR MDRD Af Amer 48 (L) >60 mL/min/1.73m2    GFR MDRD Non Af Amer 39 (L) >60 mL/min/1.73m2   Magnesium   Result Value Ref Range    Magnesium 2.0 1.8 - 2.6 mg/dL   POCT Glucose   Result Value Ref Range    Glucose, POC 77 mg/dL   EKG 12 lead POD1   Result Value Ref Range    SYSTOLIC BLOOD PRESSURE  mmHg    DIASTOLIC BLOOD PRESSURE  mmHg    VENTRICULAR RATE 63 BPM    ATRIAL RATE 63 BPM    P-R INTERVAL 186 ms    QRS DURATION 148 ms    Q-T INTERVAL 476 ms    QTC CALCULATION (BEZET) 487 ms    P Axis 51 degrees    R AXIS -33 degrees    T AXIS 146 degrees    MUSE DIAGNOSIS       Normal sinus rhythm  Left axis deviation  Left bundle branch block  Abnormal ECG  When compared with ECG of 12-JUN-2018 18:03,  No significant change was found  Confirmed by DIGNA DAVIS MD LOC:SJ ([id number]) on 6/13/2018 5:09:30 PM     Hemoglobin   Result Value Ref Range    Hemoglobin 8.2 (L) 12.0 - 16.0 g/dL   Echo Limited with Doppler   Result Value Ref Range    BSA 1.61 m2    Hieght 62 in    Weight 2046.4 lbs    /63 mmHg    HR 60 bpm    LV volume diastolic 86 46 - 106 cm3    LV volume systolic 57 14 - 42 cm3    IVSd 1.4 0.6 - 0.9 cm    LVIDd 4.8 3.8 - 5.2 cm    LVIDs 3.4 2.2 - 3.5 cm    LVOT diam 1.8 cm    LVOT mean gradient 6 mmHg    LVOT peak VTI 35.3 cm    LVOT mean lissette 107 cm/s    LVOT peak lissette 180 cm/s    LVOT peak gradient 13 mmHg    LV PWd 1.5 0.6 - 0.9 cm    AV mean lissette 183 cm/s    AV mean gradient 17 mmHg    AV VTI 67.8 cm    AV peak lissette 297 cm/s    AO ascending 3.2 cm    TR peak lissette 373 cm/s    IVS/PW ratio 0.9     TR peak gradent 55.7 mmHg    LV FS 29.2 28 - 44 %    Echo LVEF calculated 34 55 - 75 %    LV mass 288.4 g    AV area 1.3 cm2    AV DIM IND lissette 0.6     LVOT area 2.54 cm2    LVOT SV 89.8 cm3    AV peak gradient 35.3 mmHg    LV systolic volume index 35.4 11 - 31 cm3/m2    LV diastolic volume index 53.4 34 - 74 cm3/m2    LV mass index 179.1 g/m2    LV SVi 55.8 ml/m2    LV  CO 5.4 l/min    LV Ci 3.3 l/min/m2    Height 62.0 in    Weight 128 lbs    AV DIM IND VTI 0.5     Echo LVEF Estimated 45 %   POCT Glucose   Result Value Ref Range    Glucose, POC 87 mg/dL   POCT Glucose   Result Value Ref Range    Glucose,  mg/dL   Hemoglobin   Result Value Ref Range    Hemoglobin 9.3 (L) 12.0 - 16.0 g/dL   POCT Glucose   Result Value Ref Range    Glucose,  mg/dL   POCT Glucose   Result Value Ref Range    Glucose,  mg/dL   Crossmatch   Result Value Ref Range    Crossmatch COMPATIBLE     Blood Expiration Date [id number]     Unit Type O Pos     Unit Number [id number]     Status Transfused     Component Red Blood Cells     PRODUCT CODE W0650Z39     Issue Date and Time 92120027151252     Blood Type 5100     CODING SYSTEM [id number]    Crossmatch   Result Value Ref Range    Crossmatch COMPATIBLE     Blood Expiration Date [id number]     Unit Type O Pos     Unit Number [id number]     Status Transfused     Component Red Blood Cells     PRODUCT CODE N5819S09     Issue Date and Time [id number]     Blood Type 5100     CODING SYSTEM [id number]    Hemoglobin   Result Value Ref Range    Hemoglobin 8.5 (L) 12.0 - 16.0 g/dL   ECG 12 lead with MUSE   Result Value Ref Range    SYSTOLIC BLOOD PRESSURE  mmHg    DIASTOLIC BLOOD PRESSURE  mmHg    VENTRICULAR RATE 67 BPM    ATRIAL RATE 67 BPM    P-R INTERVAL 188 ms    QRS DURATION 152 ms    Q-T INTERVAL 468 ms    QTC CALCULATION (BEZET) 494 ms    P Axis 71 degrees    R AXIS -34 degrees    T AXIS 141 degrees    MUSE DIAGNOSIS       Normal sinus rhythm  Possible Left atrial enlargement  Left axis deviation  Left bundle branch block  Abnormal ECG  When compared with ECG of 13-JUN-2018 08:17,  No significant change was found  Confirmed by JULITO LOJA MD LOC:JN ([id number]) on 6/14/2018 2:34:05 PM     POCT Glucose   Result Value Ref Range    Glucose, POC 81 mg/dL   Hemogram   Result Value Ref Range    .3 (HH) 4.0 - 11.0 thou/uL    RBC 2.68 (L)  3.80 - 5.40 mill/uL    Hemoglobin 7.8 (L) 12.0 - 16.0 g/dL    Hematocrit 25.5 (L) 35.0 - 47.0 %    MCV 95 80 - 100 fL    MCH 29.1 27.0 - 34.0 pg    MCHC 30.6 (L) 32.0 - 36.0 g/dL    RDW 21.2 (H) 11.0 - 14.5 %    Platelets 64 (L) 140 - 440 thou/uL    MPV 11.2 8.5 - 12.5 fL   Type and Screen   Result Value Ref Range    ABORh O POS     Antibody Screen Negative Negative   Basic Metabolic Panel   Result Value Ref Range    Sodium 136 136 - 145 mmol/L    Potassium 3.4 (L) 3.5 - 5.0 mmol/L    Chloride 102 98 - 107 mmol/L    CO2 22 22 - 31 mmol/L    Anion Gap, Calculation 12 5 - 18 mmol/L    Glucose 123 70 - 125 mg/dL    Calcium 7.9 (L) 8.5 - 10.5 mg/dL    BUN 64 (H) 8 - 28 mg/dL    Creatinine 1.36 (H) 0.60 - 1.10 mg/dL    GFR MDRD Af Amer 44 (L) >60 mL/min/1.73m2    GFR MDRD Non Af Amer 37 (L) >60 mL/min/1.73m2   Magnesium   Result Value Ref Range    Magnesium 1.9 1.8 - 2.6 mg/dL   Troponin I   Result Value Ref Range    Troponin I 0.22 0.00 - 0.29 ng/mL   Procalcitonin   Result Value Ref Range    Procalcitonin 0.17 0.00 - 0.49 ng/mL   MRSA culture   Result Value Ref Range    Culture 2+ MRSA Coagulase Positive Staph (!)    POCT Glucose   Result Value Ref Range    Glucose,  mg/dL   Troponin I   Result Value Ref Range    Troponin I 0.19 0.00 - 0.29 ng/mL   Hemoglobin   Result Value Ref Range    Hemoglobin 9.6 (L) 12.0 - 16.0 g/dL   Crossmatch   Result Value Ref Range    Crossmatch COMPATIBLE     Blood Expiration Date [id number]     Unit Type O Pos     Unit Number [id number]     Status Released     Component Red Blood Cells     PRODUCT CODE G9381Y50     Blood Type 5100     CODING SYSTEM [id number]    Crossmatch   Result Value Ref Range    Crossmatch COMPATIBLE     Blood Expiration Date [id number]     Unit Type O Pos     Unit Number [id number]     Status Transfused     Component Red Blood Cells     PRODUCT CODE P3431E17     Issue Date and Time [id number]     Blood Type 5100     CODING SYSTEM [id number]     Crossmatch   Result Value Ref Range    Crossmatch COMPATIBLE     Blood Expiration Date [id number]     Unit Type O Pos     Unit Number [id number]     Status Released     Component Red Blood Cells     PRODUCT CODE O1661W89     Blood Type 5100     CODING SYSTEM [id number]    Crossmatch   Result Value Ref Range    Crossmatch COMPATIBLE     Blood Expiration Date [id number]     Unit Type O Pos     Unit Number [id number]     Status Released     Component Red Blood Cells     PRODUCT CODE L3131L55     Blood Type 5100     CODING SYSTEM [id number]    Basic Metabolic Panel   Result Value Ref Range    Sodium 139 136 - 145 mmol/L    Potassium 3.4 (L) 3.5 - 5.0 mmol/L    Chloride 105 98 - 107 mmol/L    CO2 22 22 - 31 mmol/L    Anion Gap, Calculation 12 5 - 18 mmol/L    Glucose 67 (L) 70 - 125 mg/dL    Calcium 8.3 (L) 8.5 - 10.5 mg/dL    BUN 66 (H) 8 - 28 mg/dL    Creatinine 1.13 (H) 0.60 - 1.10 mg/dL    GFR MDRD Af Amer 55 (L) >60 mL/min/1.73m2    GFR MDRD Non Af Amer 45 (L) >60 mL/min/1.73m2   HM2(CBC w/o Differential)   Result Value Ref Range    .7 (HH) 4.0 - 11.0 thou/uL    RBC 2.24 (L) 3.80 - 5.40 mill/uL    Hemoglobin 6.5 (LL) 12.0 - 16.0 g/dL    Hematocrit 22.5 (L) 35.0 - 47.0 %     80 - 100 fL    MCH 29.0 27.0 - 34.0 pg    MCHC 28.9 (L) 32.0 - 36.0 g/dL    RDW 20.6 (H) 11.0 - 14.5 %    Platelets 73 (L) 140 - 440 thou/uL    MPV 12.0 8.5 - 12.5 fL   Type and Screen   Result Value Ref Range    ABORh O POS     Antibody Screen Negative Negative   HM2 (CBC W/O DIFF)   Result Value Ref Range    .8 (HH) 4.0 - 11.0 thou/uL    RBC 2.32 (L) 3.80 - 5.40 mill/uL    Hemoglobin 6.8 (LL) 12.0 - 16.0 g/dL    Hematocrit 22.1 (L) 35.0 - 47.0 %    MCV 95 80 - 100 fL    MCH 29.3 27.0 - 34.0 pg    MCHC 30.8 (L) 32.0 - 36.0 g/dL    RDW 20.1 (H) 11.0 - 14.5 %    Platelets 110 (L) 140 - 440 thou/uL    MPV 12.2 8.5 - 12.5 fL   Basic Metabolic Panel   Result Value Ref Range    Sodium 136 136 - 145 mmol/L    Potassium  5.1 (H) 3.5 - 5.0 mmol/L    Chloride 102 98 - 107 mmol/L    CO2 22 22 - 31 mmol/L    Anion Gap, Calculation 12 5 - 18 mmol/L    Glucose 99 70 - 125 mg/dL    Calcium 8.4 (L) 8.5 - 10.5 mg/dL    BUN 60 (H) 8 - 28 mg/dL    Creatinine 1.22 (H) 0.60 - 1.10 mg/dL    GFR MDRD Af Amer 50 (L) >60 mL/min/1.73m2    GFR MDRD Non Af Amer 42 (L) >60 mL/min/1.73m2   INR   Result Value Ref Range    INR 1.16 (H) 0.90 - 1.10   Crossmatch   Result Value Ref Range    Crossmatch COMPATIBLE     Blood Expiration Date [id number]     Unit Type O Pos     Unit Number [id number]     Status Issued     Component Red Blood Cells     PRODUCT CODE K6598N38     Issue Date and Time [id number]     Blood Type 5100     CODING SYSTEM [id number]    Crossmatch   Result Value Ref Range    Crossmatch COMPATIBLE     Blood Expiration Date [id number]     Unit Type O Pos     Unit Number [id number]     Status Ready     Component Red Blood Cells     PRODUCT CODE I1516G50     Blood Type 5100     CODING SYSTEM [id number]            Assessment/Plan:  1. Aortic stenosis     2. S/P TAVR (transcatheter aortic valve replacement)     3. Postoperative anemia     4. Leukocytosis     5. Chronic lymphocytic leukemia (H)     6. Atrial fibrillation (H)     7. Congestive heart failure (H)       Patient status post Soham secondary to severe aortic stenosis.  She has had some acute blood loss anemia postprocedure.  She is underwent 3 units of packed red blood cells.  Hemoglobin had rebounded to 9.6 however continues to drift down previously was 7.8 now 6.5.  She reports feeling tired and weak.  Hemodynamically stable.  No tachycardia.  She was to undergo transfusion in the a.m.  She has underlying CLL.  Leukocytosis chronic.  Currently afebrile.  Current white blood cell count 186.  Currently on no treatment.  However daughter tells me today that she previously was however this is been held.  She was also receiving Aranesp.  I did update her oncologist Dr. Lockhart and  he would like her sent in to ER.     I will send to [facility]'s ER at the family's request.       60 minutes spent of which greater than 50% was face to face communication with the patient about above plan of care    Electronically signed by: Susie Walsh, CNP

## 2021-06-18 NOTE — ANESTHESIA CARE TRANSFER NOTE
Last vitals:   Vitals:    06/12/18 1653   BP: 156/46   Pulse: (!) 54   Resp: 18   Temp: 36.8  C (98.2  F)   SpO2: 98%     Patient's level of consciousness is awake  Spontaneous respirations: yes  Maintains airway independently: yes  Dentition unchanged: yes  Oropharynx: oropharynx clear of all foreign objects    QCDR Measures:  ASA# 20 - Surgical Safety Checklist: WHO surgical safety checklist completed prior to induction  PQRS# 430 - Adult PONV Prevention: 4558F - Pt received => 2 anti-emetic agents (different classes) preop & intraop  ASA# 8 - Peds PONV Prevention: NA - Not pediatric patient, not GA or 2 or more risk factors NOT present  PQRS# 424 - Kim-op Temp Management: 4559F - At least one body temp DOCUMENTED => 35.5C or 95.9F within required timeframe  PQRS# 426 - PACU Transfer Protocol:NA - Patient did not go to PACU  ASA# 14 - Acute Post-op Pain: ASA14B - Patient did NOT experience pain >= 7 out of 10

## 2021-06-18 NOTE — ANESTHESIA PROCEDURE NOTES
Arterial Line    Start time: 5/31/2018 9:14 AM  End time: 5/31/2018 9:18 AM  Staffing:  Performing  Anesthesiologist: JUAN DANIEL CASPER  Performing CRNA: DOROTEO GREWAL  Sterile Precautions:  sterile barriers used during insertion: cap, mask, sterile gloves, large sheet, and hand hygiene used.  Arterial Line:   Immediately prior to procedure a time out was called to verify the correct patient, procedure, equipment, support staff and site/side marked as required  Laterality: right  Location: radial  Prepped with: ChloroPrep    Needle gauge: 20 G  Number of Attempts: 1  Secured with: tape and transparent dressing  Flushed with: saline  1% lidocaine local anesthesia used for skin prep.   See MAR for additional medications given.  Ultrasound evaluation of access site: yes  Vessel patent by US exam    Concurrent real time visualization of needle entry

## 2021-06-18 NOTE — ANESTHESIA CARE TRANSFER NOTE
Last vitals:   Vitals:    05/31/18 0946   BP: 162/72   Pulse: 70   Resp: 22   Temp:    SpO2: 94%     Patient's level of consciousness is drowsy  Spontaneous respirations: yes  Maintains airway independently: yes  Dentition unchanged: yes  Oropharynx: oropharynx clear of all foreign objects    QCDR Measures:  ASA# 20 - Surgical Safety Checklist: WHO surgical safety checklist completed prior to induction  PQRS# 430 - Adult PONV Prevention: 4558F - Pt received => 2 anti-emetic agents (different classes) preop & intraop  ASA# 8 - Peds PONV Prevention: NA - Not pediatric patient, not GA or 2 or more risk factors NOT present  PQRS# 424 - Kim-op Temp Management: NA - MAC anesthesia or case < 60 minutes  PQRS# 426 - PACU Transfer Protocol: - Transfer of care checklist used  ASA# 14 - Acute Post-op Pain: ASA14B - Patient did NOT experience pain >= 7 out of 10

## 2021-06-18 NOTE — PROGRESS NOTES
Code Status:  UNKNOWN  Visit Type: Problem Visit     Facility:  [facility] [[id number]]         Facility Type: SNF (Skilled Nursing Facility, TCU)    History of Present Illness: Brandi Patiño is a 89 y.o. female who I am seeing today for follow up on the TCU. Patient originally admitted to the hospital for elective transcatheter aortic valve replacement on 6/12/2018.  She has underlying severe aortic stenosis.  She was previously in the TCU for pneumonia.  She also has a history of CHF.  Posterior Soham procedure have postoperative anemia.  She did undergo a total of 3 units of packed red blood cells.  Her hemoglobin initially rebounded to 9.6 but has been drifting downward.  Previously was 7.8 and today 6.5.  She has underlying CLL.  She is followed by Dr. Lockhart for oncology and was requested to be sent to hospital. Her WBC was 222.  She did recently readmit to hospital on 6/19/18 with low hemoglobin. Upon admit she did have melenic stools with hemoglobin of 6.5. She did see her oncologist while hospitalized and she was transfused with 3 units of PRBCs. Her hemoglobin rebounded to 7.9. It was recommended she continue aspirin and Plavix. She continues on PPI. She did resume her CLL immunosuppressive therapy.  Patient also with recent C. difficile.  She continues on oral vancomycin until July 1.    Today she is sitting up in a bedside chair. She appears overall fatigued. She reports tiredness and shortness of breath. She is to follow up with oncology in am. She has some fluid noted in her feet. Her lungs are CTA.  She does me she is having regular bowel movements.  She is having some blood in her stools from  Hemorrhoids.    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     Melena      TACO (transfusion associated circulatory overload)      Resolved Ambulatory Problems     Diagnosis Date Noted     Anemia 06/08/2015     HOLLEY (acute kidney injury) (H) 06/08/2015     Diastolic heart failure (H) 06/08/2015     Dyspnea 06/08/2015     Acute respiratory failure (H) 06/08/2015     Pericardial effusion 06/10/2015     Symptomatic anemia 02/21/2016     Tinnitus, bilateral      Weakness generalized      Acute respiratory failure with hypoxia (H)      Accelerated hypertension      Weakness      Acute on chronic diastolic congestive heart failure (H)      Dysuria      Urinary tract infection without hematuria, site unspecified      Paroxysmal atrial fibrillation (H)      Acute on chronic systolic and diastolic heart failure, NYHA class 3      Troponin level elevated 04/02/2018     S/p aortic valvuloplasty      Pulmonary edema 05/25/2018     Pulmonary infiltrate      Prosthetic aortic valve stenosis      Past Medical History:   Diagnosis Date     Acute non-ST elevation myocardial infarction (NSTEMI) (H) 03/29/2018     Anemia 6/8/2015     Chest pain with normal coronary angiography 04/02/2018     Chronic diastolic heart failure due to valvular disease (H)      CLL (chronic lymphocytic leukemia) (H)      DNAR (do not attempt resuscitation)      Essential hypertension      Fracture dislocation of ankle joint 11/16/2014     Hypothyroidism      Pneumonia of right lung due to infectious organism, unspecified part of lung      Severe aortic stenosis        Current Outpatient Prescriptions   Medication Sig Note     aspirin 81 mg chewable tablet Chew 1 tablet (81 mg total) daily.      calcium, as carbonate, (TUMS) 200 mg calcium (500 mg) chewable tablet Chew 1 tablet (200 mg total) 3 (three) times a day for 7 days.      clonazePAM (KLONOPIN) 0.5  MG tablet Take 1 tablet (0.5 mg total) by mouth at bedtime as needed (sleep).      clopidogrel (PLAVIX) 75 mg tablet Take 1 tablet (75 mg total) by mouth daily.      diltiazem (CARDIZEM CD) 300 MG 24 hr capsule Take 300 mg by mouth daily.      ibrutinib (IMBRUVICA) 140 mg cap capsule Take by mouth daily.      levothyroxine (SYNTHROID, LEVOTHROID) 125 MCG tablet Take 125 mcg by mouth Daily at 6:00 am.       multivitamin with minerals (THERA-M) 9 mg iron-400 mcg Tab tablet Take 1 tablet by mouth daily.      omeprazole (PRILOSEC) 20 MG capsule Take 1 capsule (20 mg total) by mouth 2 (two) times a day before meals.      predniSONE (DELTASONE) 20 MG tablet Take 20 mg by mouth daily. 3/29/2018: Dose increased to 20 mg daily on 3/29/18     psyllium (METAMUCIL) 3.4 gram packet Take 1 packet by mouth daily.      torsemide (DEMADEX) 20 MG tablet Take 2 tablets (40 mg total) by mouth 2 (two) times a day at 9am and 6pm.      traZODone (DESYREL) 50 MG tablet Take 1 tablet (50 mg total) by mouth at bedtime as needed for sleep.      vancomycin (VANCOCIN) 125 mg/2.5 mL Syrg solution Take 2.5 mL (125 mg total) by mouth 4 (four) times a day at 6am, 12noon, 6pm and 10pm. for 7 days.        Allergies   Allergen Reactions     Blood-Group Specific Substance Unknown     Anti-E and Warm autoantibodies present. Expect delays in blood for transfusion up to 24 hours.   Draw 2 lavender and 1 red tube for type and screen orders.     Oxycodone Other (See Comments)     Hallucinations     Vasotec [Enalaprilat] Cough         Review of Systems   No fevers or chills. No headache, lightheadedness or dizziness.  She does report feelings of tiredness and weakness.  Occasional SOB, no chest pains or palpitations. Appetite is poor.  No nausea, vomiting, constipation or diarrhea. No dysuria, frequency, burning or pain with urination.    Physical Exam   PHYSICAL EXAMINATION:  Vital signs: /67, heart rate 66 125/67, heart rate 66, respirations 16,  temperature 98.6, 94% on RA, current weight 134 lbs.   General: Awake, Alert, oriented x3, appropriately, follows simple commands, conversant.  Appears overall fatigue.   HEENT:PERRLA, Pink conjunctiva, anicteric sclerae, moist oral mucosa  NECK: Supple, without any lymphadenopathy, or masses  CVS:  S1  S2, without murmur or gallop.   LUNG: Clear to auscultation, No wheezes, rales or rhonci.  BACK: No kyphosis of the thoracic spine  ABDOMEN: Soft, nontender to palpation, with positive bowel sounds  EXTREMITIES: Good range of motion on both upper and lower extremities, 1-2+ pedal edema, no calf tenderness  SKIN: Warm and dry, no rashes or erythema noted  NEUROLOGIC: Intact, pulses palpable  PSYCHIATRIC: Cognition intact            Labs:    Recent Results (from the past 240 hour(s))   Crossmatch   Result Value Ref Range    Crossmatch COMPATIBLE     Blood Expiration Date [id number]     Unit Type O Pos     Unit Number [id number]     Status Released     Component Red Blood Cells     PRODUCT CODE O5077W87     Blood Type 5100     CODING SYSTEM [id number]    Crossmatch   Result Value Ref Range    Crossmatch COMPATIBLE     Blood Expiration Date [id number]     Unit Type O Pos     Unit Number [id number]     Status Released     Component Red Blood Cells     PRODUCT CODE V1007D96     Blood Type 5100     CODING SYSTEM [id number]    Basic Metabolic Panel   Result Value Ref Range    Sodium 139 136 - 145 mmol/L    Potassium 3.4 (L) 3.5 - 5.0 mmol/L    Chloride 105 98 - 107 mmol/L    CO2 22 22 - 31 mmol/L    Anion Gap, Calculation 12 5 - 18 mmol/L    Glucose 67 (L) 70 - 125 mg/dL    Calcium 8.3 (L) 8.5 - 10.5 mg/dL    BUN 66 (H) 8 - 28 mg/dL    Creatinine 1.13 (H) 0.60 - 1.10 mg/dL    GFR MDRD Af Amer 55 (L) >60 mL/min/1.73m2    GFR MDRD Non Af Amer 45 (L) >60 mL/min/1.73m2   HM2(CBC w/o Differential)   Result Value Ref Range    .7 (HH) 4.0 - 11.0 thou/uL    RBC 2.24 (L) 3.80 - 5.40 mill/uL    Hemoglobin 6.5 (LL) 12.0  - 16.0 g/dL    Hematocrit 22.5 (L) 35.0 - 47.0 %     80 - 100 fL    MCH 29.0 27.0 - 34.0 pg    MCHC 28.9 (L) 32.0 - 36.0 g/dL    RDW 20.6 (H) 11.0 - 14.5 %    Platelets 73 (L) 140 - 440 thou/uL    MPV 12.0 8.5 - 12.5 fL   Type and Screen   Result Value Ref Range    ABORh O POS     Antibody Screen Negative Negative   HM2 (CBC W/O DIFF)   Result Value Ref Range    .8 (HH) 4.0 - 11.0 thou/uL    RBC 2.32 (L) 3.80 - 5.40 mill/uL    Hemoglobin 6.8 (LL) 12.0 - 16.0 g/dL    Hematocrit 22.1 (L) 35.0 - 47.0 %    MCV 95 80 - 100 fL    MCH 29.3 27.0 - 34.0 pg    MCHC 30.8 (L) 32.0 - 36.0 g/dL    RDW 20.1 (H) 11.0 - 14.5 %    Platelets 110 (L) 140 - 440 thou/uL    MPV 12.2 8.5 - 12.5 fL   Basic Metabolic Panel   Result Value Ref Range    Sodium 136 136 - 145 mmol/L    Potassium 5.1 (H) 3.5 - 5.0 mmol/L    Chloride 102 98 - 107 mmol/L    CO2 22 22 - 31 mmol/L    Anion Gap, Calculation 12 5 - 18 mmol/L    Glucose 99 70 - 125 mg/dL    Calcium 8.4 (L) 8.5 - 10.5 mg/dL    BUN 60 (H) 8 - 28 mg/dL    Creatinine 1.22 (H) 0.60 - 1.10 mg/dL    GFR MDRD Af Amer 50 (L) >60 mL/min/1.73m2    GFR MDRD Non Af Amer 42 (L) >60 mL/min/1.73m2   INR   Result Value Ref Range    INR 1.16 (H) 0.90 - 1.10   Occult Blood, Fecal   Result Value Ref Range    Occult Blood, Stool #1 Positive (!) Negative   Hemoglobin   Result Value Ref Range    Hemoglobin 6.8 (LL) 12.0 - 16.0 g/dL   Basic Metabolic Panel   Result Value Ref Range    Sodium 137 136 - 145 mmol/L    Potassium 2.9 (L) 3.5 - 5.0 mmol/L    Chloride 104 98 - 107 mmol/L    CO2 22 22 - 31 mmol/L    Anion Gap, Calculation 11 5 - 18 mmol/L    Glucose 80 70 - 125 mg/dL    Calcium 7.6 (L) 8.5 - 10.5 mg/dL    BUN 58 (H) 8 - 28 mg/dL    Creatinine 1.04 0.60 - 1.10 mg/dL    GFR MDRD Af Amer 60 (L) >60 mL/min/1.73m2    GFR MDRD Non Af Amer 50 (L) >60 mL/min/1.73m2   HM2(CBC w/o Differential)   Result Value Ref Range    .9 (HH) 4.0 - 11.0 thou/uL    RBC 2.92 (L) 3.80 - 5.40 mill/uL     Hemoglobin 8.4 (L) 12.0 - 16.0 g/dL    Hematocrit 26.8 (L) 35.0 - 47.0 %    MCV 92 80 - 100 fL    MCH 28.8 27.0 - 34.0 pg    MCHC 31.3 (L) 32.0 - 36.0 g/dL    RDW 17.8 (H) 11.0 - 14.5 %    Platelets 91 (L) 140 - 440 thou/uL    MPV 11.3 8.5 - 12.5 fL   Echo Complete   Result Value Ref Range    LV volume systolic 70.8 14 - 42 cm3    LV volume diastolic 163 46 - 106 cm3    LVOT peak VTI 39.0 cm    LVOT mean lissette 116 cm/s    MV E' med lissette 4.92 cm/s    LVOT diam 1.67 cm    LVOT mean gradient 6.20 mmHg    HR 65 Beats Per Minute    HR 63.83 Beats Per Minute    MV E' lat lissette 5.13 cm/s    LV PWd 1.63 0.6 - 0.9 cm    LVOT peak gradient 11.9 mmHg    IVSd 1.91 0.6 - 0.9 cm    LVIDs 3.14 2.2 - 3.5 cm    LVIDd 4.16 3.8 - 5.2 cm    LA size 4.16 cm    LA/AO root ratio 1.72 no units    AV mean gradient 23.8 mmHg    AV VTI 66.4 cm    AV mean lissette 231 cm/s    MR mean gradient 80.3 mmHg    MV mean gradient 11.1 mmHg    MV peak E lissette 176 cm/s    MR  cm    MV VTI 78.3 cm    MV P 1/2 time 119 ms    MV peak A lissette 215 cm/s    MV decel time 0.418 s    MR mean velocity 426 cm/s    MV mean lissette 157 cm/s    MR flow 58.0 cm3    MA peak gradient 4.29 mmHg    MA peak lissette 104 cm/s    AO root 2.42 cm    BSA 1.61 m2    Hieght 62 in    Weight 2091.2 lbs    /57 mmHg    IVS/PW ratio 1.2     MR peak gradent 121.9 mmHg    TR peak gradent 62.7 mmHg    LV FS 24.5 28 - 44 %    Echo LVEF calculated 57 55 - 75 %    LA volume 74.2 mL    LV mass 320.5 g    TR peak lissette 396.0 cm/s    AV area 1.3 cm2    MV area p 1/2 time 1.8 cm2    MV area cont eq 1.1 cm2    MV E/A Ratio 0.8     LVOT area 2.19 cm2    AV peak lissette 334.0 cm/s    MR PISA VN Nyq 35.00 cm/s    MR PISA radius 0.9 cm    MR PISA peak lissette 552.00 cm/s    LVOT SV 85.4 cm3    AV peak gradient 44.6 mmHg    MR PISA EROA 0.3 cm2    MR volume 55.8 cc    LV systolic volume index 44.0 11 - 31 cm3/m2    LV diastolic volume index 101.2 34 - 74 cm3/m2    LA volume index 46.1 mL/m2    LV mass index 199.1 g/m2     LV SVi 53.0 ml/m2    TAPSE 2.2 cm    MV med E/e' ratio 35.8     MV lat E/e' ratio 34.3     LA area 2 16 cm2    LA area 1 24 cm2    Height 62.0 in    Weight 131 lbs    MV Avg E/e' Ratio 35.0 cm/s    LA length 4.4 cm    AV DIM IND VTI 0.6     MVA VTI 1.09 cm2    Echo LVEF Estimated 50 %   Hemoglobin   Result Value Ref Range    Hemoglobin 8.6 (L) 12.0 - 16.0 g/dL   Potassium   Result Value Ref Range    Potassium 4.4 3.5 - 5.0 mmol/L   Calcium, Ionized, Measured   Result Value Ref Range    Calcium, Ionized Measured 1.01 (L) 1.11 - 1.30 mmol/L    Calcium, Ionized pH 7.4 1.02 (L) 1.11 - 1.30 mmol/L    pH 7.42 7.35 - 7.45   Crossmatch   Result Value Ref Range    Crossmatch COMPATIBLE     Blood Expiration Date [id number]     Unit Type O Pos     Unit Number [id number]     Status Transfused     Component Red Blood Cells     PRODUCT CODE M3149T61     Issue Date and Time [id number]     Blood Type 5100     CODING SYSTEM [id number]    Crossmatch   Result Value Ref Range    Crossmatch COMPATIBLE     Blood Expiration Date [id number]     Unit Type O Pos     Unit Number [id number]     Status Transfused     Component Red Blood Cells     PRODUCT CODE S1090C66     Issue Date and Time [id number]     Blood Type 5100     CODING SYSTEM [id number]    Calcium, Ionized, Measured   Result Value Ref Range    Calcium, Ionized Measured 1.04 (L) 1.11 - 1.30 mmol/L    Calcium, Ionized pH 7.4 1.04 (L) 1.11 - 1.30 mmol/L    pH 7.40 7.35 - 7.45   HM2(CBC w/o Differential)   Result Value Ref Range    .6 (HH) 4.0 - 11.0 thou/uL    RBC 2.82 (L) 3.80 - 5.40 mill/uL    Hemoglobin 8.2 (L) 12.0 - 16.0 g/dL    Hematocrit 26.2 (L) 35.0 - 47.0 %    MCV 93 80 - 100 fL    MCH 29.1 27.0 - 34.0 pg    MCHC 31.3 (L) 32.0 - 36.0 g/dL    RDW 18.5 (H) 11.0 - 14.5 %    Platelets 102 (L) 140 - 440 thou/uL    MPV 12.4 8.5 - 12.5 fL   Basic Metabolic Panel   Result Value Ref Range    Sodium 138 136 - 145 mmol/L    Potassium 3.6 3.5 - 5.0 mmol/L     Chloride 106 98 - 107 mmol/L    CO2 21 (L) 22 - 31 mmol/L    Anion Gap, Calculation 11 5 - 18 mmol/L    Glucose 75 70 - 125 mg/dL    Calcium 7.4 (L) 8.5 - 10.5 mg/dL    BUN 46 (H) 8 - 28 mg/dL    Creatinine 1.08 0.60 - 1.10 mg/dL    GFR MDRD Af Amer 58 (L) >60 mL/min/1.73m2    GFR MDRD Non Af Amer 48 (L) >60 mL/min/1.73m2   C. Diff Toxin By PCR   Result Value Ref Range    C.Difficile Toxigenic by PCR Positive (!) Negative    Ribotype 027/NAP1/B1 Presumptive Negative Presumptive Negative   Hemoglobin   Result Value Ref Range    Hemoglobin 8.4 (L) 12.0 - 16.0 g/dL   Potassium   Result Value Ref Range    Potassium 3.5 3.5 - 5.0 mmol/L   Calcium, Ionized, Measured   Result Value Ref Range    Calcium, Ionized Measured 1.02 (L) 1.11 - 1.30 mmol/L    Calcium, Ionized pH 7.4 1.03 (L) 1.11 - 1.30 mmol/L    pH 7.43 7.35 - 7.45   Potassium   Result Value Ref Range    Potassium 5.0 3.5 - 5.0 mmol/L   Hemoglobin   Result Value Ref Range    Hemoglobin 7.4 (L) 12.0 - 16.0 g/dL   Basic Metabolic Panel   Result Value Ref Range    Sodium 134 (L) 136 - 145 mmol/L    Potassium 4.2 3.5 - 5.0 mmol/L    Chloride 103 98 - 107 mmol/L    CO2 20 (L) 22 - 31 mmol/L    Anion Gap, Calculation 11 5 - 18 mmol/L    Glucose 75 70 - 125 mg/dL    Calcium 7.6 (L) 8.5 - 10.5 mg/dL    BUN 50 (H) 8 - 28 mg/dL    Creatinine 1.28 (H) 0.60 - 1.10 mg/dL    GFR MDRD Af Amer 48 (L) >60 mL/min/1.73m2    GFR MDRD Non Af Amer 39 (L) >60 mL/min/1.73m2   Type and Screen   Result Value Ref Range    ABORh O POS     Antibody Screen Negative Negative   Crossmatch   Result Value Ref Range    Crossmatch COMPATIBLE     Blood Expiration Date [id number]     Unit Type O Neg     Unit Number [id number]     Status Transfused     Component Red Blood Cells     PRODUCT CODE E1715P79     Issue Date and Time [id number]     Blood Type 9500     CODING SYSTEM [id number]    Potassium - Next AM   Result Value Ref Range    Potassium 4.0 3.5 - 5.0 mmol/L   Creatinine   Result Value  Ref Range    Creatinine 1.42 (H) 0.60 - 1.10 mg/dL    GFR MDRD Af Amer 42 (L) >60 mL/min/1.73m2    GFR MDRD Non Af Amer 35 (L) >60 mL/min/1.73m2   Hemoglobin   Result Value Ref Range    Hemoglobin 7.9 (L) 12.0 - 16.0 g/dL   Calcium, Ionized, Measured   Result Value Ref Range    Calcium, Ionized Measured 1.07 (L) 1.11 - 1.30 mmol/L    Calcium, Ionized pH 7.4 1.08 (L) 1.11 - 1.30 mmol/L    pH 7.43 7.35 - 7.45   HM2(CBC w/o Differential)   Result Value Ref Range    .5 (HH) 4.0 - 11.0 thou/uL    RBC 3.00 (L) 3.80 - 5.40 mill/uL    Hemoglobin 8.6 (L) 12.0 - 16.0 g/dL    Hematocrit 28.3 (L) 35.0 - 47.0 %    MCV 94 80 - 100 fL    MCH 28.7 27.0 - 34.0 pg    MCHC 30.4 (L) 32.0 - 36.0 g/dL    RDW 17.3 (H) 11.0 - 14.5 %    Platelets 190 140 - 440 thou/uL    MPV 12.0 8.5 - 12.5 fL   BNP(B-type Natriuretic Peptide)   Result Value Ref Range     (H) 0 - 167 pg/mL       Assesemnt /Plan:   1. Anemia     2. Chronic lymphoid leukemia (H)     3. Leukocytosis     4. Congestive heart failure (H)     5. S/P TAVR (transcatheter aortic valve replacement)     6. Aortic stenosis     7. Atrial fibrillation (H)     8. C. difficile colitis     9. Diastolic congestive heart failure (H)         Patient with underlying CLL.  She is followed by white count is elevated at 220.  Previously 194.6.  She has resumed her therapy for her CLL.   She has a follow-up tomorrow with Dr. Lockhart.  She received 3 units of packed red blood cells during hospitalization.  Hemoglobin today 8.6.  BNP slightly elevated at 696.  Overall fatigue with some shortness of breath.  She does have some lower extremity edema.  She continues on furosemide 40 mg 3 times daily.  Her BUN is elevated at 50.  She does report hemorrhoids.  Will order Preparation H.  Will monitor for any occult blood.  Will continue serial hemoglobins.  Atrial fib rate control was continued on Plavix and aspirin.  She is on PPI.  Patient with recent C. difficile.  She does continue on oral  vancomycin.  Denies bloody stools.  Patient seen by cardiology yesterday.  There was some questionable congestive heart failure. They did add Zaroxolyn 2.5 mg daily ×3 days.  I did not see this in the record.  Her car they will attempt to be aggressive as possible diuresis of the kidneys allow.  Is a repeat echo on July 12.  Recent echo did show increased gradient could be possible micro emboli versus fluid overload.  We will continue with daily weights.    Total time spent for this visit was 60 minutes with greater than 50% of the time spent face-to-face with patient.    Electronically signed by: Susie Walsh, CNP

## 2021-06-18 NOTE — PROGRESS NOTES
Patient arrived via wheelchair accompanied by her son. IV started in Right AC with ease, limited sites available for IV access. Two units of blood transfused with no transfusion reactions noted. Pt was given Lasix 10 mg after first unit and only voided once while here. Pt tolerated her lunch and rested quietly between checks. Pt is scheduled to have TAVR on 6-5-18 at [facility]. Upon completion of blood, IV was dc'd and site wrapped with coban. Pt left via wheelchair accompanied by her grandson, destination her home.

## 2021-06-18 NOTE — ANESTHESIA PREPROCEDURE EVALUATION
Anesthesia Evaluation      Patient summary reviewed   No history of anesthetic complications     Airway   Mallampati: III  Neck ROM: limited   Pulmonary - normal exam    breath sounds clear to auscultation  (+) pneumonia, a smoker                         Cardiovascular - normal exam  Exercise tolerance: < 4 METS  (+) hypertension, valvular problems/murmurs AS, past MI, dysrhythmias (afib), CHF, cardiomyopathy,     ECG reviewed  Rhythm: regular  Rate: normal,      ROS comment: pulm htn     Neuro/Psych - negative ROS     Endo/Other    (+) hypothyroidism,      GI/Hepatic/Renal    (-) esophageal disease     Other findings: Echo      Left ventricle ejection fraction is moderately decreased. The calculated left ventricular ejection fraction is 42%.    Normal right ventricular size and systolic function.    Aortic Valve: The valve is tricuspid. There is severe global calcification with reduced excursion of the aortic valve present. Severe aortic stenosis. Mild aortic regurgitation.    Mitral Valve: There is moderate valve leaflet calcification. There is severe mitral annular calcification. Moderate mitral Calcific stenosis. Moderate mitral regurgitation.    Moderate pulmonary hypertension present. The estimated systolic pulmonary artery pressure is 65 mmhg.    When compared to the previous study dated 4/3/2018, aortic valve mean systolic gradient is higher and degree of aortic regurgitation appears to be less severe.      Dental    (+) poor dentition and chipped                       Anesthesia Plan  Planned anesthetic: general endotracheal  Etomidate induction (please have esmolol drawn up) + glidescope    Brooke.  Fem transvenous pacing per surgeon.  LYNSEY proble placement for cards    PONV ppx    AS precautions  ASA 4   Induction: intravenous   Anesthetic plan and risks discussed with: patient and child/children  Anesthesia plan special considerations: video-assisted, increased risk of difficult airway, antiemetics, CVP  line, arterial catheterization, IV therapy two IVs,   Post-op plan: routine recovery

## 2021-06-18 NOTE — PROGRESS NOTES
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         HPI:   Brandi is a 89 y.o. female who recently residing at the [facility] undergoing physical and occupational therapy secondary to status post T AVR.  She was then sent back to the hospital on 6/19/2018 for a low hemoglobin.  Hemoglobin was under 7 and nurse practitioner did talk to her oncologist and decided to send her in.  She also had severe leukemoid leukocytosis with platelets being in the 200 range.  She was then brought to the hospital and had symptomatic anemia.  She did have melenic stools and hemoglobin was 6.5 at the TCU and was unclear why this was happening.  It looked like she did have some melena and was started on a PPI and this did improve.  She did see her oncologist and transfusions were given as needed.  It was recommended she continue aspirin and Plavix and she did receive a total of 3 units of packed red blood cells.  Hemoglobin the morning of discharge from the hospital was 7.9.  She can get fluid overloaded but seems euvolemic today and she did have a Soham valve on 612.  She had shortness of breath and did show mild to moderate aortic stenosis of the bioprosthetic valve.  She cannot be on full anticoagulation but they did recommended that due to her anemia she will follow-up with cardiology.  She did improve was treated probably and transferred here to the TCU [facility] in stable condition.     Patient feels okay today she is not in any pain but she does still feel weak.  She is not short of breath and does not have any chest pain or  "tightness.  She is walking okay at this time and she does appear to me she is a DNR/DNI.  She did reiterate her CODE STATUS with me.     Past Medical History:       Past Medical History:   Diagnosis Date     Acute non-ST elevation myocardial infarction (NSTEMI) (H) 03/29/2018     but normal coronary angiogram 4 days later     Anemia 6/8/2015     Chest pain with normal coronary angiography 04/02/2018     at time of \"NSTEMI\"     Chronic diastolic heart failure due to valvular disease (H)       CLL (chronic lymphocytic leukemia) (H)       Transfusion dependent     DNAR (do not attempt resuscitation)       Essential hypertension       Fracture dislocation of ankle joint 11/16/2014     Hypothyroidism       Pneumonia of right lung due to infectious organism, unspecified part of lung       Severe aortic stenosis            Surgical History:        Past Surgical History:   Procedure Laterality Date     APPENDECTOMY         BRONCHOSCOPY N/A 5/31/2018     Procedure: BRONCHOSCOPY;  Surgeon: Vinay Frank MD;  Location: [facility];  Service:      COLECTOMY N/A       COLONOSCOPY N/A 1/17/2018     Procedure: COLONOSCOPY WITH CECUM AND RECTUM BIOPSY;  Surgeon: Varun Allen MD;  Location: [facility] OR;  Service:      CV CORONARY ANGIOGRAM N/A 4/2/2018     Procedure: Coronary Angiogram;  Surgeon: Elroy Ybarra MD;  Location: [facility] Cath Lab;  Service:      CV TRANSFEMORAL TRANSCATHETER VALVE REPLACEMENT N/A 6/12/2018     Procedure: Transfemoral Transcatheter Aortic Valve Replacement;  Surgeon: Gabino Irving MD;  Location: [facility] Cath Lab;  Service:      HYSTERECTOMY         ORIF ANKLE FRACTURE Left 11/17/2014     Procedure: LEFT ANKLE FRACTURE OPEN REDUCTION INTERNAL FIXATION;  Surgeon: Luis Blount DPM;  Location: [facility];  Service:      TONSILLECTOMY             Family History:   Family History   Problem Relation Age of Onset     No Medical Problems Mother       " No Medical Problems Father       No Medical Problems Daughter       No Medical Problems Daughter       No Medical Problems Daughter       No Medical Problems Daughter       No Medical Problems Son       No Medical Problems Son       Valvular heart disease Neg Hx           Social History:    Social History            Social History     Marital status:        Spouse name: N/A     Number of children: 6     Years of education: N/A            Social History Main Topics     Smoking status: Former Smoker       Years: 20.00       Types: Cigarettes       Quit date: 11/17/2014     Smokeless tobacco: Never Used     Alcohol use No     Drug use: No     Sexual activity: Not Currently       Partners: Male           Other Topics Concern     None      Social History Narrative             Review of Systems   Constitutional:        Patient denies any fevers chills nausea vomiting diarrhea change in vision hearing taste or smell weakness one side of the chest pain shortness of breath.  His stools are starting to form secondary to her C. difficile is being treated at this time.  The remainder review of systems is negative.          Vitals        Vitals:     06/25/18 0856   BP: 136/56   Pulse: 75   Resp: 20   Temp: 98  F (36.7  C)   SpO2: 94%            Physical Exam   Constitutional: No distress.   HENT:   Head: Normocephalic and atraumatic.   Nose: Nose normal.   Mouth/Throat: No oropharyngeal exudate.   Eyes: Right eye exhibits no discharge. Left eye exhibits no discharge. No scleral icterus.   Neck: Neck supple. No thyromegaly present.   Cardiovascular: Normal rate and regular rhythm.    Murmur heard.  Pulmonary/Chest: Effort normal. She has no wheezes. She has rales.   Abdominal: Soft. Bowel sounds are normal. She exhibits distension. There is no tenderness.   Musculoskeletal: She exhibits no edema or tenderness.   Lymphadenopathy:     She has no cervical adenopathy.   Neurological: She is alert. She exhibits normal muscle  tone. Coordination normal.   Skin: She is not diaphoretic.   Psychiatric: She has a normal mood and affect. Her behavior is normal.         Medication List:       Current Outpatient Prescriptions   Medication Sig     aspirin 81 mg chewable tablet Chew 1 tablet (81 mg total) daily.     calcium, as carbonate, (TUMS) 200 mg calcium (500 mg) chewable tablet Chew 1 tablet (200 mg total) 3 (three) times a day for 7 days.     clonazePAM (KLONOPIN) 0.5 MG tablet Take 1 tablet (0.5 mg total) by mouth at bedtime as needed (sleep).     clopidogrel (PLAVIX) 75 mg tablet Take 1 tablet (75 mg total) by mouth daily.     diltiazem (CARDIZEM CD) 300 MG 24 hr capsule Take 300 mg by mouth daily.     ibrutinib (IMBRUVICA) 140 mg cap capsule Take by mouth daily.     levothyroxine (SYNTHROID, LEVOTHROID) 125 MCG tablet Take 125 mcg by mouth Daily at 6:00 am.      multivitamin with minerals (THERA-M) 9 mg iron-400 mcg Tab tablet Take 1 tablet by mouth daily.     omeprazole (PRILOSEC) 20 MG capsule Take 1 capsule (20 mg total) by mouth 2 (two) times a day before meals.     predniSONE (DELTASONE) 20 MG tablet Take 20 mg by mouth daily.     psyllium (METAMUCIL) 3.4 gram packet Take 1 packet by mouth daily.     torsemide (DEMADEX) 20 MG tablet Take 2 tablets (40 mg total) by mouth 2 (two) times a day at 9am and 6pm.     traZODone (DESYREL) 50 MG tablet Take 1 tablet (50 mg total) by mouth at bedtime as needed for sleep.     vancomycin (VANCOCIN) 125 mg/2.5 mL Syrg solution Take 2.5 mL (125 mg total) by mouth 4 (four) times a day at 6am, 12noon, 6pm and 10pm. for 7 days.         Labs:        Assessment:      ICD-10-CM     1. S/P TAVR (transcatheter aortic valve replacement) Z95.2     2. Chronic lymphocytic leukemia (H) C91.10     3. Chronic lymphoid leukemia (H) C91.10     4. Congestive heart failure (H) I50.9     5. Anemia D64.9     6. Aortic stenosis I35.0           Plan: Plan at this time CBC be done tomorrow second his CLL and anemia and  will call oncologist hematologist with patient's platelets less than 50,000.  Check basic metabolic profile tomorrow secondary diuresis and will continue to monitor above medical problems.  She will continue with physical and occupational therapy at this time and no other changes to care plan at this time.  Care plan was  reviewed and is appropriate.           Electronically signed by: Yuri Gu DO

## 2021-06-18 NOTE — PROGRESS NOTES
"[facility]      Facility:    [facility] [[id number]]  Code Status: UNKNOWN      Chief Complaint/Reason for Visit:  Chief Complaint   Patient presents with     H & P     Status post transaortic valve replacement, CHF exacerbation, acute hypoxic respiratory failure chronic lymphoid leukemia with leukocytosis, anemia requiring 2 units of packed red blood cells,.       HPI:   Brandi is a 89 y.o. female who was recently admitted to the hospital for elective trans-catheter aortic valve replacement.  This was done secondary to severe aortic stenosis she recently was hospitalized and in the TCU for pneumonia.  She does have CHF and she underwent the T AVR on 6/12/2018.  She underwent the procedure without any perioperative complications however she did receive 2 units of packed red blood cells secondary to low hemoglobin.  One more unit of blood was given and hemoglobin went up to 9.6.  She was started on furosemide for diuresis by cardiology secondary congestive heart failure and home Lasix was discontinued.  She did continue the aspirin and Plavix for now and she was treated appropriately and transferred here to the TCU at [facility] in stable condition.    Patient has no complaints today although she does feel very weak.  She did have a bout of pneumonia which she had a previous hospitalization and she does have elevated white blood count secondary to leukemia.  Her appetite is poor at this time but she is trying to eat and she denies any other issues at this time.    Past Medical History:  Past Medical History:   Diagnosis Date     Acute non-ST elevation myocardial infarction (NSTEMI) (H) 03/29/2018    but normal coronary angiogram 4 days later     Anemia 6/8/2015     Chest pain with normal coronary angiography 04/02/2018    at time of \"NSTEMI\"     Chronic diastolic heart failure due to valvular disease (H)      CLL (chronic lymphocytic leukemia) (H)     " Transfusion dependent     DNAR (do not attempt resuscitation)      Essential hypertension      Fracture dislocation of ankle joint 11/16/2014     Hypothyroidism      Severe aortic stenosis            Surgical History:  Past Surgical History:   Procedure Laterality Date     APPENDECTOMY       BRONCHOSCOPY N/A 5/31/2018    Procedure: BRONCHOSCOPY;  Surgeon: Vinay Frank MD;  Location: [facility];  Service:      COLECTOMY N/A      COLONOSCOPY N/A 1/17/2018    Procedure: COLONOSCOPY WITH CECUM AND RECTUM BIOPSY;  Surgeon: Varun Allen MD;  Location: [facility];  Service:      CV CORONARY ANGIOGRAM N/A 4/2/2018    Procedure: Coronary Angiogram;  Surgeon: Elroy Ybarra MD;  Location: [facility] Cath Lab;  Service:      CV TRANSFEMORAL TRANSCATHETER VALVE REPLACEMENT N/A 6/12/2018    Procedure: Transfemoral Transcatheter Aortic Valve Replacement;  Surgeon: Gabino Irving MD;  Location: [facility] Cath Lab;  Service:      HYSTERECTOMY       ORIF ANKLE FRACTURE Left 11/17/2014    Procedure: LEFT ANKLE FRACTURE OPEN REDUCTION INTERNAL FIXATION;  Surgeon: Luis Blount DPM;  Location: [facility];  Service:      TONSILLECTOMY         Family History:   Family History   Problem Relation Age of Onset     No Medical Problems Mother      No Medical Problems Father      No Medical Problems Daughter      No Medical Problems Daughter      No Medical Problems Daughter      No Medical Problems Daughter      No Medical Problems Son      No Medical Problems Son      Valvular heart disease Neg Hx        Social History:    Social History     Social History     Marital status:      Spouse name: N/A     Number of children: 6     Years of education: N/A     Social History Main Topics     Smoking status: Former Smoker     Years: 20.00     Types: Cigarettes     Quit date: 11/17/2014     Smokeless tobacco: Never Used     Alcohol use No     Drug use: No     Sexual activity: Not  Currently     Partners: Male     Other Topics Concern     None     Social History Narrative          Review of Systems   Constitutional:        Review of systems is positive for generalized weakness and poor appetite.  She denies any fevers chills nausea vomiting diarrhea change in vision hearing taste or smell weakness one side of the chest pain shortness of breath.  She is no incontinence urine or stool polyphagia or polydipsia polyuria urgency frequency or burning with urination and the remainder the review of systems is negative.       Vitals:    06/18/18 0737   BP: 119/62   Pulse: 67   Resp: 18   Temp: 98.3  F (36.8  C)   SpO2: 93%       Physical Exam   Constitutional: No distress.   HENT:   Head: Normocephalic and atraumatic.   Nose: Nose normal.   Mouth/Throat: No oropharyngeal exudate.   Eyes: Right eye exhibits no discharge. Left eye exhibits no discharge.   Neck: Neck supple.   Cardiovascular: Normal rate and regular rhythm.    Murmur heard.  Pulmonary/Chest: Effort normal and breath sounds normal. No respiratory distress. She has no wheezes. She has no rales.   Abdominal: Soft. Bowel sounds are normal. She exhibits distension. There is no tenderness.   Musculoskeletal:   Trace edema bilateral.   Lymphadenopathy:     She has no cervical adenopathy.   Neurological: She is alert. No cranial nerve deficit. She exhibits normal muscle tone.   Skin: Skin is warm and dry. She is not diaphoretic.   Psychiatric: She has a normal mood and affect. Her behavior is normal.       Medication List:  Current Outpatient Prescriptions   Medication Sig     aspirin 81 mg chewable tablet Chew 1 tablet (81 mg total) daily.     clonazePAM (KLONOPIN) 0.5 MG tablet Take 1 tablet (0.5 mg total) by mouth at bedtime as needed.     clopidogrel (PLAVIX) 75 mg tablet Take 1 tablet (75 mg total) by mouth daily.     diltiazem (CARDIZEM CD) 300 MG 24 hr capsule Take 300 mg by mouth daily.     levothyroxine (SYNTHROID, LEVOTHROID) 125 MCG  tablet Take 125 mcg by mouth Daily at 6:00 am.      multivitamin with minerals (THERA-M) 9 mg iron-400 mcg Tab tablet Take 1 tablet by mouth daily.     predniSONE (DELTASONE) 20 MG tablet Take 20 mg by mouth daily.     psyllium (METAMUCIL) 3.4 gram packet Take 1 packet by mouth daily.     torsemide (DEMADEX) 20 MG tablet Take 2 tablets (40 mg total) by mouth daily.     traZODone (DESYREL) 50 MG tablet Take 1 tablet (50 mg total) by mouth at bedtime as needed for sleep. (Patient taking differently: Take 50 mg by mouth at bedtime. )       Labs: Hospital labs are as follows; troponins was 0.19 and 0.22, hemoglobin is 9.68.5, pro calcitonin 0.17.  Sodium was 136, potassium 3.4, CO2 was 22, calcium was 7.9, creatinine was 1.36, GFR was 37, magnesium was 1.9.  White count was 211.3 thousand and somewhere between 167,000 likely secondary to her lymphoid leukemia.  Platelets 107,000.  Hemoglobin was as low as 7.8.      Assessment:    ICD-10-CM    1. Aortic stenosis I35.0    2. S/P TAVR (transcatheter aortic valve replacement) Z95.2    3. Postoperative anemia D64.9    4. Chronic lymphoid leukemia (H) C91.10    5. Congestive heart failure (H) I50.9    6. Leukocytosis D72.829        Plan: In at this time basic metabolic profile CBC be done today secondary to congestive heart failure and diuretic therapy.  As well as anemia as well as leukocytosis.  We will continue with physical and occupational therapy at this time and because she does have congestive heart failure we will weigh her every day.  Registered dietitian will see secondary to her poor appetite and will continue to monitor above medical problems.  No other changes to care plan at this time.        Electronically signed by: Yuri Gu DO

## 2021-06-18 NOTE — PROGRESS NOTES
[facility]  Letter to referring provider  Today's Date 6/11/2018                Dear Dr. Loyd,  Thank you for your referral. I wanted to update you on your severe aortic stenosis patient, Brandi Patiño who was recently evaluated by our Heart Team.      As you know, current ACC/AHA Guidelines for the Management of Patients with Valvular Heart Disease indicates aortic valve replacement for virtually all patients with severe, symptomatic aortic stenosis. Without replacement of the aortic valve, this disease is life-threatening and previous studies have shown that 50 percent of patients will not survive more than an average of two years after the onset of symptoms.      Our Heart Team, which includes a cardiothoracic surgeon, interventional cardiologist, TAVR Coordinator, echocardiographer, anesthesiologist, and pre- and postoperative care providers, takes a rigorous, multi-disciplinary approach to patient care to provide appropriate patient selection.      After assessing your patient s diagnostic test results and conducting a thorough clinical, cardiovascular and surgical assessment, our Heart Team has recommended that your patient undergo TAVR. The procedure is scheduled to take place on 6/12/2018 at [facility] with Dr. Pinto. Patients typically return for follow up at 30 days and one year post procedure.  Outside of these follow up points, you can expect to continue providing care to your patient.    Collaboration between practices can be a critical component to determining the best course of therapy for severe, symptomatic aortic stenosis patients.  Our Heart Team looks forward to partnering with you to provide quality care for these challenging patients.    CC: Dr. Miles    Sincerely,     Dr. Ioana Pinto, Interventional Cardiology  Dr. Gabino Irving, Interventional Cardiology  Dr. Rocco Shetty, Cardiothoracic Surgery  Azeb Loaiza, RN, BSN, PHN, CCRN

## 2021-06-18 NOTE — ANESTHESIA PREPROCEDURE EVALUATION
Anesthesia Evaluation      Patient summary reviewed   No history of anesthetic complications     Airway   Mallampati: III  Neck ROM: full   Pulmonary - normal exam    breath sounds clear to auscultation  (+) pneumonia, a smoker                         Cardiovascular - normal exam  Exercise tolerance: > or = 4 METS  (+) hypertension, valvular problems/murmurs AS, past MI, dysrhythmias, ,     Rhythm: regular  Rate: normal,         Neuro/Psych - negative ROS     Endo/Other    (+) hypothyroidism,   (-) not pregnant     GI/Hepatic/Renal - negative ROS           Dental    (+) poor dentition and chipped                       Anesthesia Plan  Planned anesthetic: general endotracheal  -- GETA  -- brittany pre induction  -- as precautions  -- Esmolol drawn up and ready for administration  -- PONV prophylaxis with Decadron 10 mg and Zofran 4 mg    ASA 4   Induction: intravenous   Anesthetic plan and risks discussed with: patient and child/children  Anesthesia plan special considerations: increased risk of difficult airway, antiemetics, arterial catheterization,   Post-op plan: routine recovery

## 2021-06-18 NOTE — PROGRESS NOTES
[facility]      Facility:    [facility] [[id number]]  Code Status: UNKNOWN      Chief Complaint/Reason for Visit:  Chief Complaint   Patient presents with     H & P     Aortic stenosis, chronic lymphocytic leukemia, anemia, acute on chronic systolic congestive heart failure, lung mass, pneumonia, persistent atrial fibrillation.       HPI:   Brandi is a 89 y.o. female who is admitted to the hospital on 5/25/2018.  She does have significant and severe aortic stenosis as well as chronic lymphocytic leukemia.  Because of her anemia she was going in for blood transfusions and received 2 units of packed red blood cells and she claims she had Lasix in between.  This was on 5/24/2018 shortly after that she developed worsening shortness of breath and she was in acute hypoxic respiratory failure.  She was diagnosed with acute on chronic congestive heart failure with preserved ejection fraction as a result of the blood transfusion and required BiPAP and CPAP with transition to nasal cannula.  During her hospitalization she was diuresed with IV diuretics with furosemide and continued on Lasix p.o.  She received packed red blood cell transfusions on 529 as well as 530 for persistent hypoxia and needed no further IV diuresis.  She did have a probable right upper lobe enlarging mass and pulmonary medicine was consulted.  She also had possible pneumonia and it is unclear whether she has mantle cell lymphoma at this time and final oncology is pending.  She was treated with antibiotics during that time and she is scheduled to have a trans-thoracic aortic valve replacement next Tuesday.  She was treated appropriately and transferred here to the TCU in stable condition.    Patient feels well at this time is no sign of any hypoxia or shortness of breath.  Her color does look good and she claims she is back to her baseline functioning.  She has no pain issues at this time and staff have no new  "concerns.    Past Medical History:  Past Medical History:   Diagnosis Date     Acute non-ST elevation myocardial infarction (NSTEMI) 03/29/2018    but normal coronary angiogram 4 days later     Anemia 6/8/2015     Chest pain with normal coronary angiography 04/02/2018    at time of \"NSTEMI\"     Chronic diastolic heart failure due to valvular disease      CLL (chronic lymphocytic leukemia)     Transfusion dependent     DNAR (do not attempt resuscitation)      Essential hypertension      Fracture dislocation of ankle joint 11/16/2014     Hypothyroidism      Severe aortic stenosis            Surgical History:  Past Surgical History:   Procedure Laterality Date     APPENDECTOMY       BRONCHOSCOPY N/A 5/31/2018    Procedure: BRONCHOSCOPY;  Surgeon: Vinay Frank MD;  Location: [facility];  Service:      COLECTOMY N/A      COLONOSCOPY N/A 1/17/2018    Procedure: COLONOSCOPY WITH CECUM AND RECTUM BIOPSY;  Surgeon: Varun Allen MD;  Location: [facility] OR;  Service:      CV CORONARY ANGIOGRAM N/A 4/2/2018    Procedure: Coronary Angiogram;  Surgeon: Elroy Ybarra MD;  Location: [facility] Cath Lab;  Service:      HYSTERECTOMY       ORIF ANKLE FRACTURE Left 11/17/2014    Procedure: LEFT ANKLE FRACTURE OPEN REDUCTION INTERNAL FIXATION;  Surgeon: Luis Blount DPM;  Location: [facility] OR;  Service:      TONSILLECTOMY         Family History:   Family History   Problem Relation Age of Onset     No Medical Problems Mother      No Medical Problems Father      No Medical Problems Daughter      No Medical Problems Daughter      No Medical Problems Daughter      No Medical Problems Daughter      No Medical Problems Son      No Medical Problems Son      Valvular heart disease Neg Hx        Social History:    Social History     Social History     Marital status:      Spouse name: N/A     Number of children: 6     Years of education: N/A     Social History Main Topics     Smoking " status: Former Smoker     Years: 20.00     Types: Cigarettes     Quit date: 11/17/2014     Smokeless tobacco: Never Used     Alcohol use No     Drug use: No     Sexual activity: Not Currently     Partners: Male     Other Topics Concern     None     Social History Narrative          Review of Systems   Constitutional: Negative for activity change, chills and fever.   HENT: Negative for hearing loss.    Eyes: Negative for visual disturbance.   Respiratory: Negative for apnea, chest tightness and shortness of breath.    Cardiovascular: Negative for chest pain and palpitations.   Gastrointestinal: Negative for abdominal pain, constipation, nausea and vomiting.   Endocrine: Negative for polydipsia, polyphagia and polyuria.   Genitourinary: Negative for decreased urine volume and urgency.   Musculoskeletal: Negative for neck pain and neck stiffness.   Skin: Negative for rash.   Hematological: Does not bruise/bleed easily.   Psychiatric/Behavioral: Negative for agitation and behavioral problems.       Vitals:    06/08/18 0818   BP: 124/49   Pulse: 78   Resp: 16   Temp: 97.9  F (36.6  C)   SpO2: 92%       Physical Exam   Constitutional: She appears well-developed and well-nourished. No distress.   HENT:   Head: Normocephalic and atraumatic.   Nose: Nose normal.   Mouth/Throat: Oropharynx is clear and moist. No oropharyngeal exudate.   Eyes: Conjunctivae and EOM are normal. Pupils are equal, round, and reactive to light. Right eye exhibits no discharge. Left eye exhibits no discharge. No scleral icterus.   Neck: Neck supple. No tracheal deviation present. No thyromegaly present.   Cardiovascular: Exam reveals no gallop and no friction rub.    Murmur heard.  Patient's heart rate is irregularly irregular with adequate rate control   Pulmonary/Chest: Effort normal and breath sounds normal. No respiratory distress. She has no wheezes.   Abdominal: Soft. Bowel sounds are normal. She exhibits no distension and no mass. There is no  tenderness.   Musculoskeletal: Normal range of motion. She exhibits edema. She exhibits no tenderness.   Lymphadenopathy:     She has no cervical adenopathy.   Neurological: She is alert. No cranial nerve deficit. She exhibits normal muscle tone.   Skin: Skin is warm and dry. She is not diaphoretic. No erythema.   Psychiatric: She has a normal mood and affect. Her behavior is normal.       Medication List:  Current Outpatient Prescriptions   Medication Sig     aspirin 81 mg chewable tablet Chew 1 tablet (81 mg total) daily.     clonazePAM (KLONOPIN) 0.5 MG tablet Take 1 tablet (0.5 mg total) by mouth at bedtime as needed.     diltiazem (CARDIZEM CD) 300 MG 24 hr capsule Take 300 mg by mouth daily.     furosemide (LASIX) 40 MG tablet Take 2 tablets (80 mg total) by mouth 2 (two) times a day at 9am and 6pm.     levothyroxine (SYNTHROID, LEVOTHROID) 125 MCG tablet Take 125 mcg by mouth Daily at 6:00 am.      metOLazone (ZAROXOLYN) 2.5 MG tablet Take 1 tablet (2.5 mg total) by mouth daily.     multivitamin with minerals (THERA-M) 9 mg iron-400 mcg Tab tablet Take 1 tablet by mouth daily.     potassium chloride (KLOR-CON) 10 MEQ CR tablet Take 2 tablets (20 mEq total) by mouth daily.     predniSONE (DELTASONE) 20 MG tablet Take 20 mg by mouth daily.     psyllium (METAMUCIL) 3.4 gram packet Take 1 packet by mouth daily.     traZODone (DESYREL) 50 MG tablet Take 1 tablet (50 mg total) by mouth at bedtime as needed for sleep. (Patient taking differently: Take 50 mg by mouth at bedtime. )       Labs:      Assessment:    ICD-10-CM    1. Diastolic congestive heart failure I50.30    2. Aortic stenosis I35.0    3. Chronic lymphocytic leukemia C91.10    4. Anemia D64.9    5. Pneumonia J18.9    6. Atrial fibrillation I48.91        Plan: Plan at this time patient does seem euvolemic by exam however we will get a basic metabolic profile today as well as a hemoglobin.  She is going to have a preop with a primary care physician on  Monday and her atrial fibrillation her rate is in good control.  She is having no symptoms of chest pain and shortness of breath at this time and no signs of infectious issues.  I will continue to monitor above medical problems will incorporate physical and occupational therapy and med monitoring during her time here.  No other changes to care plan at this time.        Electronically signed by: Yuri Gu,

## 2021-06-18 NOTE — PROGRESS NOTES
Patient in to see RN for Pre-TAVR visit on 6/11/2018     All pre-procedure labs drawn: 6/11/2018    EKG obtained: 6/11/2018    Patient sent to radiology for chest X ray: 6/11/2018   MRSA nose swab collected: 6/11/2018  Bactroban administered in nares: 6/11/2018   5 meter walk: 6/11/2018 10.25, 11.03, 10.78  IS instructions given to patient by RN. Patient able to demonstrate proper use of IS.     STS score: 8.16%  NYHA score: 3  CCS score: 0    Labs reviewed: Yes, - Dr. Pinto aware. Plan to proceed.    Patient instructed on medications:   Medication hold instructions sent to TCU. Instructed to hold lasix and metolazone the morning of TAVR.    Loading dose of Plavix: 300 mg AM of TAVR   Loading dose of ASA: 325 mg AM of TAVR    Sent home with Hibiclens solution. Instructions given in detail to patient along with written directions.    Patient has advanced directive: Yes, patient's daughter is going to bring and updated version to scan into chart    Education was given to patient regarding what to expect pre-procedure.     In[facility]ed [facility]    TAVR and LYNSEY consent signed at the time of the appt: Yes- sent to [facility]    All questions were answered to family and patient by RN.    Patient and her three daughters were present at the time of appointment.

## 2021-06-18 NOTE — ANESTHESIA POSTPROCEDURE EVALUATION
Patient: Brandi MUNSON Haselfrancisca  BRONCHOSCOPY  Anesthesia type: general    Patient location: PACU  Last vitals:   Vitals:    05/31/18 1000   BP: 161/72   Pulse: 68   Resp: (!) 37   Temp:    SpO2: (!) 86%     Post vital signs: stable  Level of consciousness: awake and responds to simple questions  Post-anesthesia pain: pain controlled  Post-anesthesia nausea and vomiting: no  Pulmonary: unassisted, return to baseline  Cardiovascular: stable and blood pressure at baseline  Hydration: adequate  Anesthetic events: no    QCDR Measures:  ASA# 11 - Kim-op Cardiac Arrest: ASA11B - Patient did NOT experience unanticipated cardiac arrest  ASA# 12 - Kim-op Mortality Rate: ASA12B - Patient did NOT die  ASA# 13 - PACU Re-Intubation Rate: ASA13B - Patient did NOT require a new airway mgmt  ASA# 10 - Composite Anes Safety: ASA10A - No serious adverse event    Additional Notes:

## 2021-06-19 NOTE — PROGRESS NOTES
[facility]      Facility:    [facility] [[id number]]  Code Status: UNKNOWN      Chief Complaint/Reason for Visit:  Chief Complaint   Patient presents with     H & P     Anemia, chronic lymphocytic leukemia, status post blood transfusion, recent C. difficile colitis requiring hospitalization TCU stay, hypokalemia, hyponatremia, rectal bleeding, malnutrition, weakness and deconditioning, hyponatremia, acute kidney injury.       HPI:   Brandi is a 89 y.o. female who recently residing here at the [place] [facility] was recently in the hospital and then transferred to the TCU she was then recovered in the TCU secondary to transthoracic aortic valve replacement.  She was doing okay and then was recently sent back to the hospital on 7/2/2018 secondary to a low hemoglobin.  Nurse practitioner did discuss this with oncology and patient does have CLL and white count was also elevated hemoglobin is down to 6.6 was decided to send her in for transfusions.  She was admitted to the hospital she did have C. difficile colitis and she does finish a 10 day course of vancomycin on 7 1 and she was admitted to the hospital.  She received 2 units of packed red blood cells and hemoglobin did stabilize.  It was asked that we monitor the hemoglobin here in the TCU.  She does have chronic lymphocytic leukemia and she is on medications imbruvica she did finish a course of prednisone.  She did see her oncologist yesterday and I am awaiting his note.  They did discontinue scheduled senna and she did have hypokalemia and her magnesium was normal.  She does have chronic kidney disease stage II and this her acute kidney injury did improve with gentle hydration.  They did hold the diuretics at this time.  They did resume her aspirin and Plavix and discharge and she was treated appropriately and transferred here to the TCU [facility] in stable condition.    Patient feels today  "that she is weakness in the lower extremities bilateral this is been getting worse over time.  She did see her oncologist today and she is scheduled to see get an echocardiogram today.  She currently feels well other than that and has no new concerns at this time.  She is moving her bowels without difficulty and urinating without difficulty.    Past Medical History:  Past Medical History:   Diagnosis Date     Acute non-ST elevation myocardial infarction (NSTEMI) (H) 03/29/2018    but normal coronary angiogram 4 days later     Anemia 6/8/2015     Chest pain with normal coronary angiography 04/02/2018    at time of \"NSTEMI\"     Chronic diastolic heart failure due to valvular disease (H)      CLL (chronic lymphocytic leukemia) (H)     Transfusion dependent     DNAR (do not attempt resuscitation)      Essential hypertension      Fracture dislocation of ankle joint 11/16/2014     Hypothyroidism      Pneumonia of right lung due to infectious organism, unspecified part of lung      Severe aortic stenosis            Surgical History:  Past Surgical History:   Procedure Laterality Date     APPENDECTOMY       BRONCHOSCOPY N/A 5/31/2018    Procedure: BRONCHOSCOPY;  Surgeon: Vinay Frank MD;  Location: [facility] OR;  Service:      COLECTOMY N/A      COLONOSCOPY N/A 1/17/2018    Procedure: COLONOSCOPY WITH CECUM AND RECTUM BIOPSY;  Surgeon: Varun Allen MD;  Location: [facility] OR;  Service:      CV CORONARY ANGIOGRAM N/A 4/2/2018    Procedure: Coronary Angiogram;  Surgeon: Elroy Ybarra MD;  Location: [facility] Cath Lab;  Service:      CV TRANSFEMORAL TRANSCATHETER VALVE REPLACEMENT N/A 6/12/2018    Procedure: Transfemoral Transcatheter Aortic Valve Replacement;  Surgeon: Gabino Irving MD;  Location: [facility] Cath Lab;  Service:      HYSTERECTOMY       ORIF ANKLE FRACTURE Left 11/17/2014    Procedure: LEFT ANKLE FRACTURE OPEN REDUCTION INTERNAL FIXATION;  Surgeon: Luis Blount, " MICHAEL;  Location: [facility] OR;  Service:      TONSILLECTOMY         Family History:   Family History   Problem Relation Age of Onset     No Medical Problems Mother      No Medical Problems Father      No Medical Problems Daughter      No Medical Problems Daughter      No Medical Problems Daughter      No Medical Problems Daughter      No Medical Problems Son      No Medical Problems Son      Valvular heart disease Neg Hx        Social History:    Social History     Social History     Marital status:      Spouse name: N/A     Number of children: 6     Years of education: N/A     Social History Main Topics     Smoking status: Former Smoker     Years: 20.00     Types: Cigarettes     Quit date: 11/17/2014     Smokeless tobacco: Never Used     Alcohol use No     Drug use: No     Sexual activity: Not Currently     Partners: Male     Other Topics Concern     None     Social History Narrative          Review of Systems   Constitutional:        Patient's review of systems is positive for weakness in lower extremities and overall weakness.  She does not have a good appetite however she denies any fevers chills nausea vomiting diarrhea change in vision hearing taste or smell weakness one side the other chest pain or shortness of breath.  She is not incontinent urine or stool denies any polyphagia or polydipsia polyuria urgency frequency or burning with urination.  Denies any depression or anxiety.  The remainder the review of systems is negative.       Vitals:    07/12/18 0743   BP: 117/47   Pulse: (!) 57   Resp: 18   Temp: 96.6  F (35.9  C)   SpO2: (!) 86%       Physical Exam   Constitutional: No distress.   HENT:   Head: Normocephalic and atraumatic.   Nose: Nose normal.   Mouth/Throat: No oropharyngeal exudate.   Eyes: Conjunctivae are normal. Right eye exhibits no discharge. Left eye exhibits no discharge.   Cardiovascular: Normal rate.    Patient's heart sounds were irregularly irregular with adequate rate  control.   Pulmonary/Chest: Effort normal and breath sounds normal. No respiratory distress. She has no wheezes. She has no rales.   Abdominal: Soft. Bowel sounds are normal. She exhibits no distension.   Musculoskeletal: She exhibits no tenderness.   Patient has trace edema which is a little bit worse than the right than the left.   Neurological: She is alert. No cranial nerve deficit.   Patient's neuro exam is negative she is moving all 4 extremities however her legs are weak but she is 4-5 strength in both legs bilateral and no focal findings.   Skin: Skin is warm and dry. She is not diaphoretic. No erythema.   Psychiatric: She has a normal mood and affect. Her behavior is normal.       Medication List:  Current Outpatient Prescriptions   Medication Sig     aspirin 81 mg chewable tablet Chew 1 tablet (81 mg total) daily.     calcium, as carbonate, (TUMS) 200 mg calcium (500 mg) chewable tablet Chew 1 tablet (200 mg total) 3 (three) times a day.     clonazePAM (KLONOPIN) 0.5 MG tablet Take 1 tablet (0.5 mg total) by mouth at bedtime as needed (sleep).     clopidogrel (PLAVIX) 75 mg tablet Take 1 tablet (75 mg total) by mouth daily.     diltiazem (CARDIZEM CD) 300 MG 24 hr capsule Take 300 mg by mouth daily.     ibrutinib (IMBRUVICA) 140 mg cap capsule Take 140 mg by mouth at bedtime.      levothyroxine (SYNTHROID, LEVOTHROID) 125 MCG tablet Take 125 mcg by mouth Daily at 6:00 am.      multivitamin with minerals (THERA-M) 9 mg iron-400 mcg Tab tablet Take 1 tablet by mouth daily.     omeprazole (PRILOSEC) 20 MG capsule Take 1 capsule (20 mg total) by mouth 2 (two) times a day before meals.     psyllium (METAMUCIL) 3.4 gram packet Take 1 packet by mouth daily.     torsemide (DEMADEX) 20 MG tablet Take 2 tablets (40 mg total) by mouth daily.     traZODone (DESYREL) 50 MG tablet Take 1 tablet (50 mg total) by mouth at bedtime as needed for sleep.       Labs: Hospital labs are as follows; white count was 277.9,  hemoglobin 10.3, platelets 235, absolute neutrophils were 250.1.  Magnesium was 2.6, sodium was 135, potassium was 3.5, calcium 7.7, creatinine was 1.12, GFR is 46, BUN is 59.  Alk phosphatase was 182, AST, ALT, bilirubin, were within normal limits.    Assessment:    ICD-10-CM    1. S/P TAVR (transcatheter aortic valve replacement) Z95.2    2. Anemia D64.9    3. Atrial fibrillation (H) I48.91    4. Leukocytosis D72.829    5. Chronic lymphoid leukemia (H) C91.10    6. Acute kidney injury (H) N17.9        Plan: Plan at this time will continue to hold the diuretics and not start those at this time.  She can have an echocardiogram today with cardiology and will continue to monitor I will check a basic metabolic profile as well as a CBC today secondary to her acute kidney injury as well as her anemia and chronic lymphoid leukemia.  We will continue with physical and occupational therapy and work on lower extremity strength at this time and no other changes to care plan at this time.        Electronically signed by: Yuri Gu DO

## 2021-06-19 NOTE — PROGRESS NOTES
Code Status:  UNKNOWN  Visit Type: Problem Visit     Facility:  [facility] [[id number]]         Facility Type: SNF (Skilled Nursing Facility, TCU)    History of Present Illness: Brandi Patiño is a 89 y.o. female who I am seeing today for follow up on the TCU. Patient originally admitted to the hospital for elective transcatheter aortic valve replacement on 6/12/2018.  Patient was recently rehospitalized on 7/2/2018 with increasing shortness of breath, lower extremity edema and anemia.  She was treated for fluid overload, acute on chronic renal failure and anemia.  She did undergo transfusion.  She has underlying CLL.  She did undergo treatment of prednisone which has concluded as well as removal, which will be increased.  She has underlying severe aortic stenosis.  She also has a history of CHF.  Status post TAVR procedure with  postoperative anemia.  She did undergo a total of 3 units of packed red blood cells.  Her hemoglobin initially rebounded to 9.6 but has been drifting downward.  Previously was 7.8.   She has underlying CLL.  She is followed by Dr. Lockhart for oncology and was requested to be sent to hospital. Her WBC was 222.  She did recently readmit to hospital on 6/19/18 with low hemoglobin. Upon admit she did have melenic stools with hemoglobin of 6.5. She did see her oncologist while hospitalized and she was transfused with 3 units of PRBCs. Her hemoglobin rebounded to 7.9. It was recommended she continue aspirin and Plavix. She continues on PPI. She did resume her CLL immunosuppressive therapy.  Patient also with recent C. difficile.   she has completed her treatment.    Today patient sitting up in bedside chair.  She denies any shortness of breath.  She does have some crackles in the left lower lobe.  Increasing lower extremity edema 1-2+.  No chest pain.  No evidence of bleed.  Continues with weakness.      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     Melena      TACO (transfusion associated circulatory overload)      Anemia 07/02/2018     Diarrhea 07/02/2018     Hypokalemia      Hyponatremia      Anemia due to blood loss, acute      S/P AVR      Generalized muscle weakness      Severe malnutrition (H)      Acute kidney injury (H)      Resolved Ambulatory Problems     Diagnosis Date Noted     Anemia 06/08/2015     HOLLEY (acute kidney injury) (H) 06/08/2015     Diastolic heart failure (H) 06/08/2015     Dyspnea 06/08/2015     Acute respiratory failure (H) 06/08/2015     Pericardial effusion 06/10/2015     Symptomatic anemia 02/21/2016     Tinnitus, bilateral      Weakness generalized      Acute respiratory failure with hypoxia (H)      Accelerated hypertension      Weakness      Acute on chronic diastolic congestive heart failure (H)      Dysuria      Urinary tract infection without hematuria, site unspecified      Paroxysmal atrial fibrillation (H)      Acute on chronic systolic and diastolic heart failure, NYHA class 3      Troponin level elevated 04/02/2018     S/p aortic valvuloplasty      Pulmonary edema 05/25/2018     Pulmonary infiltrate      Prosthetic aortic valve stenosis      Past Medical History:   Diagnosis Date     Acute non-ST elevation myocardial infarction (NSTEMI) (H) 03/29/2018     Anemia 6/8/2015     Chest pain with normal coronary angiography 04/02/2018     Chronic diastolic heart failure due to valvular disease (H)      CLL (chronic lymphocytic leukemia) (H)      DNAR (do not attempt resuscitation)      Essential hypertension      Fracture dislocation of ankle joint 11/16/2014      Hypothyroidism      Pneumonia of right lung due to infectious organism, unspecified part of lung      Severe aortic stenosis        Reviewed at the facility.     Allergies   Allergen Reactions     Adhesive Tape-Silicones Other (See Comments)     Patient has crepy skin and 3M red dot leads causes skin to come off.  Use blue round sensitive skin leads.     Blood-Group Specific Substance Unknown     Anti-E and Warm autoantibodies present. Expect delays in blood for transfusion up to 24 hours.   Draw 2 lavender and 1 red tube for type and screen orders.     Oxycodone Other (See Comments)     Hallucinations     Vasotec [Enalaprilat] Cough         Review of Systems   No fevers or chills. No headache, lightheadedness or dizziness.  She does report feelings of tiredness and weakness.  No shortness of breath or chest pains or palpitations. Appetite is poor.  No nausea, vomiting, constipation or diarrhea. She does c/o hemorrhoids and blood in her stools. Bright red. No dysuria, frequency, burning or pain with urination.  Increased lower extremity edema.    Physical Exam   PHYSICAL EXAMINATION:  Vital signs: /56, heart rate 72, respirations 20, O2 sat 96% on room air, temperature 98.9.  Current weight 119.4 pounds   General: Awake, Alert, oriented x3, appropriately, follows simple commands, conversant.  Appears overall fatigue.   HEENT:PERRLA, Pink conjunctiva, anicteric sclerae, moist oral mucosa  NECK: Supple, without any lymphadenopathy, or masses  CVS:  S1  S2, without murmur or gallop.   LUNG: No shortness of breath at rest.  Crackles in left lower lobe.    BACK: No kyphosis of the thoracic spine  ABDOMEN: Soft, nontender to palpation, with positive bowel sounds  EXTREMITIES: Good range of motion on both upper and lower extremities, 1-2+pedal edema, no calf tenderness  SKIN: Multiple bruises to upper and lower extremities.  Scabs along the shins.  NEUROLOGIC: Intact, pulses palpable  PSYCHIATRIC: Cognition intact.   Flat affect.            Labs:    Recent Results (from the past 240 hour(s))   Echo Complete   Result Value Ref Range    LV volume diastolic 75 46 - 106 cm3    LV volume systolic 33 14 - 42 cm3    HR 68 bpm    HR 63 bpm    IVSd 1.4 0.6 - 0.9 cm    LVIDd 3.9 3.8 - 5.2 cm    LVIDs 2.6 2.2 - 3.5 cm    LVOT diam 1.8 cm    LVOT mean gradient 1 mmHg    LVOT peak VTI 14.1 cm    LVOT mean lissette 54.2 cm/s    LVOT peak lissette 79.7 cm/s    LVOT peak gradient 3 mmHg    LV PWd 1.5 0.6 - 0.9 cm    MV E' lat lissette 3.7 cm/s    MV E' med lissette 3.51 cm/s    AV mean lissette 174 cm/s    AV mean gradient 15 mmHg    AV VTI 49 cm    AV peak lissette 278 cm/s    AO root 2.9 cm    LA size 4.1 cm    MV decel time 433 ms    MV peak A lissette 143 cm/s    MV peak E lissette 73.5 cm/s    RI peak lissette 106 cm/s    RI peak gradient 4 mmHg    PV mean lissette 104 cm/s    PV mean gradient 6 mmHg    PV VTI 21.3 cm    PV VTI 14.3 cm    PV VTI 28.3 cm    PV peak velocity 179 cm/s    RVOT mean lissette 62.2 cm/s    RVOT mean gradient 2 mmHg    RVOT peak VTI 15 cm    RVOT mean lissette 62.2 cm/s    RVOT mean gradient 2 mmHg    RVOT peak VTI 15 cm    RVOT peak lissette 85.4 cm/s    RVOT peak gradient 3 mmHg    TR peak lissette 320 cm/s    LA area 2 16.4 cm2    LA area 1 19.7 cm2    LA length 4.7 cm    BSA 1.57 m2    Hieght 62 in    Weight 2000 lbs    /40 mmHg    IVS/PW ratio 0.9     TR peak gradent 41.0 mmHg    LV FS 33.3 28 - 44 %    Echo LVEF calculated 56 55 - 75 %    LA volume 58.4 mL    LV mass 212.9 g    AV area 0.7 cm2    AV DIM IND lissette 0.3     PV peak gradient 12.8 mmHg    MV E/A Ratio 0.5     LVOT area 2.54 cm2    LVOT SV 35.9 cm3    AV peak gradient 30.9 mmHg    LV systolic volume index 21.0 11 - 31 cm3/m2    LV diastolic volume index 47.8 34 - 74 cm3/m2    LA volume index 37.2 mL/m2    LV mass index 135.6 g/m2    LV SVi 22.8 ml/m2    MV med E/e' ratio 20.9     MV lat E/e' ratio 19.9     Height 62.0 in    Weight 125 lbs    MV Avg E/e' Ratio 20.4 cm/s    AV DIM IND VTI 0.3    Basic Metabolic  Panel   Result Value Ref Range    Sodium 135 (L) 136 - 145 mmol/L    Potassium 4.2 3.5 - 5.0 mmol/L    Chloride 98 98 - 107 mmol/L    CO2 24 22 - 31 mmol/L    Anion Gap, Calculation 13 5 - 18 mmol/L    Glucose 81 70 - 125 mg/dL    Calcium 7.8 (L) 8.5 - 10.5 mg/dL    BUN 46 (H) 8 - 28 mg/dL    Creatinine 1.33 (H) 0.60 - 1.10 mg/dL    GFR MDRD Af Amer 46 (L) >60 mL/min/1.73m2    GFR MDRD Non Af Amer 38 (L) >60 mL/min/1.73m2   HM1 (CBC with Diff)   Result Value Ref Range    .9 (HH) 4.0 - 11.0 thou/uL    RBC 3.02 (L) 3.80 - 5.40 mill/uL    Hemoglobin 8.5 (L) 12.0 - 16.0 g/dL    Hematocrit 28.6 (L) 35.0 - 47.0 %    MCV 95 80 - 100 fL    MCH 28.1 27.0 - 34.0 pg    MCHC 29.7 (L) 32.0 - 36.0 g/dL    RDW 18.0 (H) 11.0 - 14.5 %    Platelets 169 140 - 440 thou/uL    MPV 10.5 8.5 - 12.5 fL   Manual Differential   Result Value Ref Range    Total Neutrophils % 11 (L) 50 - 70 %    Lymphocytes % 89 (H) 20 - 40 %    Monocytes % 1 (L) 2 - 10 %    Eosinophils %  0 0 - 6 %    Basophils % 0 0 - 2 %    Total Neutrophils Absolute 28.3 (H) 2.0 - 7.7 thou/ul    Lymphocytes Absolute 240.2 (H) 0.8 - 4.4 thou/uL    Monocytes Absolute 1.3 (H) 0.0 - 0.9 thou/uL    Eosinophils Absolute 0.0 0.0 - 0.4 thou/uL    Basophils Absolute 0.0 0.0 - 0.2 thou/uL    Smudge Cells Present (!) None Seen    Platelet Estimate Normal Normal    Ovalocytes 1+ (!) Negative    Polychromasia 1+ (!) Negative       Assesemnt /Plan:   1. S/P TAVR (transcatheter aortic valve replacement)     2. Atrial fibrillation (H)     3. Diastolic congestive heart failure (H)     4. Chronic lymphoid leukemia (H)     5. Leukocytosis     6. Anemia       Patient status post TAVR.  Continues in therapy for over weakness and deconditioning.  Today (presents with increased lower extremity edema 1-2+.  She does have congestive heart failure.  She denies any shortness of breath.  She does have crackles in the left lower lobe.  She continues on furosemide 40 mg daily.  Will increase to 60  over the next 3 days then resume 40.  We will follow-up laboratory on Thursday including CBC and BMP.  History of CLL.  Leukocytosis.  White count over 200.  She does continue on immunosuppressive therapy.  She is followed by Hem/Onc.  Anemia.  Hemoglobin 8.5.  Will continue to monitor.      Electronically signed by: Susie Walsh, SANTOS

## 2021-06-19 NOTE — PROGRESS NOTES
[facility] Care Note    Assessment:    Brandi Patiño is a 89 y.o. old female with severe AS s/p TAVR w/ a #23 S3 valve in '18, AF, CLL, anemia, HTN, hypothyroidism, here for 30d f/u post TAVR.        Plan:  # AS- s/p successful TAVR w/ a #23 S3 valve. Post procedural gradients initially increased to 24, likely due to flow/volume issues, as latest mean gradient is down to 17 after more aggressive diuresis  - continue ASA/clopidogrel/torsemide  - can consider DC'ing clopidogrel in 3 mos ideally, or next week after f/u w/ her Oncologist and re-check CBC, if HgB still continues to drift down  - she seems fairly euvolemic, but will also check a BMP today, decrease torsemide to 40mg daily since she is now off predisone but may need titration if starts to build up fluid, gets shortness of breath, weight goes up by >2lbs in a day or 5lbs in 2 days  - strict Na retriction    # AF- continue dilt, no on AC due to ongoing anemia  - can consider LAAO closure but she would like to defer further w/u until she feels stronger    # Anemia - likely mutlifactorial w/ impaired bone marrow function w/ CLL +/- occult GIB  - HgB today down to 7.8  - will defer transfusion schedule to Hem/Onc, but if continues to be an issue, can attempt stopping clopidogrel sooner as per above    NURIS TORRES  [facility] HEART [facility]  [phone]  ______________________________________________________________________    Subjective:  CC: F/u AS    I had the opportunity to see Brandi Patiño at the [facility]. Brandi Patiño is a 89 y.o. female with a known history of severe AS s/p TAVR w/ a #23 S3 valve in '18, AF, CLL, anemia, HTN, hypothyroidism, here for 30d f/u post TAVR.    She has overall done very well. He shortness of breath has much improved, no CP, orthopnea/PND. Edema much better, in fact she was a bit dehydrated 2 wks ago.  No syncope/pre-syncope. Does feel a bit more tired w/ stopping prednisnone. No  "N/V/F/C, diarrhea resolved.  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     Persistent atrial fibrillation (H)     S/P TAVR (transcatheter aortic valve replacement)     Melena     TACO (transfusion associated circulatory overload)     Anemia     Diarrhea     Hypokalemia     Hyponatremia     Anemia due to blood loss, acute     S/P AVR     Generalized muscle weakness     Severe malnutrition (H)     Acute kidney injury (H)     Medical History:  Past Medical History:   Diagnosis Date     Acute non-ST elevation myocardial infarction (NSTEMI) (H) 03/29/2018    but normal coronary angiogram 4 days later     Anemia 6/8/2015     Chest pain with normal coronary angiography 04/02/2018    at time of \"NSTEMI\"     Chronic diastolic heart failure due to valvular disease (H)      CLL (chronic lymphocytic leukemia) (H)     Transfusion dependent     DNAR (do not attempt resuscitation)      Essential hypertension      Fracture dislocation of ankle joint 11/16/2014     Hypothyroidism      Pneumonia of right lung due to infectious organism, unspecified part of lung      Severe aortic stenosis      Surgical History:  Past Surgical History:   Procedure Laterality Date     APPENDECTOMY       BRONCHOSCOPY N/A 5/31/2018    Procedure: BRONCHOSCOPY;  Surgeon: Vinay Frank MD;  Location: [facility] OR;  Service:      COLECTOMY N/A      COLONOSCOPY N/A 1/17/2018    Procedure: COLONOSCOPY WITH CECUM AND RECTUM BIOPSY;  Surgeon: Varun Allen MD;  Location: [facility] OR;  Service:      CV CORONARY ANGIOGRAM N/A 4/2/2018 "    Procedure: Coronary Angiogram;  Surgeon: Elroy Ybarra MD;  Location: [facility] Cath Lab;  Service:      CV TRANSFEMORAL TRANSCATHETER VALVE REPLACEMENT N/A 6/12/2018    Procedure: Transfemoral Transcatheter Aortic Valve Replacement;  Surgeon: Gabino Irving MD;  Location: [facility] Cath Lab;  Service:      HYSTERECTOMY       ORIF ANKLE FRACTURE Left 11/17/2014    Procedure: LEFT ANKLE FRACTURE OPEN REDUCTION INTERNAL FIXATION;  Surgeon: Luis Blount DPM;  Location: [facility];  Service:      TONSILLECTOMY       Social History:  Social History     Social History     Marital status:      Spouse name: N/A     Number of children: 6     Years of education: N/A     Occupational History     Not on file.     Social History Main Topics     Smoking status: Former Smoker     Years: 20.00     Types: Cigarettes     Quit date: 11/17/2014     Smokeless tobacco: Never Used     Alcohol use No     Drug use: No     Sexual activity: Not Currently     Partners: Male     Other Topics Concern     Not on file     Social History Narrative     Family History:  Family History   Problem Relation Age of Onset     No Medical Problems Mother      No Medical Problems Father      No Medical Problems Daughter      No Medical Problems Daughter      No Medical Problems Daughter      No Medical Problems Daughter      No Medical Problems Son      No Medical Problems Son      Valvular heart disease Neg Hx      Allergies:  Allergies   Allergen Reactions     Adhesive Tape-Silicones Other (See Comments)     Patient has crepy skin and 3M red dot leads causes skin to come off.  Use blue round sensitive skin leads.     Blood-Group Specific Substance Unknown     Anti-E and Warm autoantibodies present. Expect delays in blood for transfusion up to 24 hours.   Draw 2 lavender and 1 red tube for type and screen orders.     Oxycodone Other (See Comments)     Hallucinations     Vasotec [Enalaprilat] Cough      Medications:  Current Outpatient Prescriptions   Medication Sig Dispense Refill     aspirin 81 mg chewable tablet Chew 1 tablet (81 mg total) daily.  0     calcium, as carbonate, (TUMS) 200 mg calcium (500 mg) chewable tablet Chew 1 tablet (200 mg total) 3 (three) times a day.  0     clonazePAM (KLONOPIN) 0.5 MG tablet Take 1 tablet (0.5 mg total) by mouth at bedtime as needed (sleep). 10 tablet 0     clopidogrel (PLAVIX) 75 mg tablet Take 1 tablet (75 mg total) by mouth daily. 30 tablet 0     diltiazem (CARDIZEM CD) 300 MG 24 hr capsule Take 300 mg by mouth daily.       ibrutinib (IMBRUVICA) 140 mg cap capsule Take 140 mg by mouth at bedtime.        levothyroxine (SYNTHROID, LEVOTHROID) 125 MCG tablet Take 125 mcg by mouth Daily at 6:00 am.        multivitamin with minerals (THERA-M) 9 mg iron-400 mcg Tab tablet Take 1 tablet by mouth daily.       omeprazole (PRILOSEC) 20 MG capsule Take 1 capsule (20 mg total) by mouth 2 (two) times a day before meals. 60 capsule 0     psyllium (METAMUCIL) 3.4 gram packet Take 1 packet by mouth daily.       torsemide (DEMADEX) 20 MG tablet Take 2 tablets (40 mg total) by mouth daily.  0     traZODone (DESYREL) 50 MG tablet Take 1 tablet (50 mg total) by mouth at bedtime as needed for sleep. 20 tablet 0     No current facility-administered medications for this visit.      Objective:   Vital signs:  There were no vitals taken for this visit.    Physical Exam:  GENERAL APPEARANCE: Alert, cooperative and in no acute distress.   HEENT: No scleral icterus. Oral mucuos membranes pink and moist.   NECK: JVP 7. No Hepatojugular reflux. Thyroid not visualized. No lymphadenopathy   CHEST: clear to auscultation   CARDIOVASCULAR: S1, S2 without murmur ,clicks or rubs. Brachial, radial and posterior tibial pulses are intact and symetric. No carotid bruits noted. No edema  ABDOMEN: Nontender. BS+. No bruits.   SKIN: No Xanthelasma   Musculoskeletal: No cyanosis, clubbing or swelling.    Lab  Results:  LIPIDS:  Lab Results   Component Value Date    CHOL 199 06/29/2016    CHOL 208 (H) 11/20/2015    CHOL 239 (H) 09/16/2014     Lab Results   Component Value Date    HDL 65 06/29/2016    HDL 44 (L) 11/20/2015    HDL 36 (L) 09/16/2014     Lab Results   Component Value Date    LDLCALC 113 06/29/2016    LDLCALC 130 (H) 11/20/2015    LDLCALC  09/16/2014      Comment:      Invalid, Triglycerides >300     Lab Results   Component Value Date    TRIG 104 06/29/2016    TRIG 168 (H) 11/20/2015    TRIG 318 (H) 09/16/2014     No components found for: CHOLHDL    BMP:  Lab Results   Component Value Date    CREATININE 1.33 (H) 07/12/2018    BUN 46 (H) 07/12/2018     (L) 07/12/2018    K 4.2 07/12/2018    CL 98 07/12/2018    CO2 24 07/12/2018       [facility]

## 2021-06-19 NOTE — PROGRESS NOTES
Code Status:  UNKNOWN  Visit Type: Problem Visit     Facility:  [facility] [[id number]]         Facility Type: SNF (Skilled Nursing Facility, TCU)    History of Present Illness: Brandi Patiño is a 89 y.o. female who I am seeing today for follow up on the TCU. Patient originally admitted to the hospital for elective transcatheter aortic valve replacement on 6/12/2018.  Patient was recently rehospitalized on 7/2/2018 with increasing shortness of breath, lower extremity edema and anemia.  She was treated for fluid overload, acute on chronic renal failure and anemia.  She did undergo transfusion.  She has underlying CLL.  She did undergo treatment of prednisone which has concluded as well as removal, which will be increased.  She has underlying severe aortic stenosis.  She also has a history of CHF.  Status post TAVR procedure with  postoperative anemia.  She did undergo a total of 3 units of packed red blood cells.  Her hemoglobin initially rebounded to 9.6 but has been drifting downward.  Previously was 7.8.   She has underlying CLL.  She is followed by Dr. Lockhart for oncology and was requested to be sent to hospital. Her WBC was 222.  She did recently readmit to hospital on 6/19/18 with low hemoglobin. Upon admit she did have melenic stools with hemoglobin of 6.5. She did see her oncologist while hospitalized and she was transfused with 3 units of PRBCs. Her hemoglobin rebounded to 7.9. It was recommended she continue aspirin and Plavix. She continues on PPI. She did resume her CLL immunosuppressive therapy.  Patient also with recent C. difficile.   she has completed her treatment.    Today patient lying in bed.  She went out for echocardiogram today.  She tells me she is tired.  No shortness of breath.      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/12/2018     Melena      TACO (transfusion associated circulatory overload)      Anemia 07/02/2018     Diarrhea 07/02/2018     Hypokalemia      Hyponatremia      Anemia due to blood loss, acute      S/P AVR      Generalized muscle weakness      Severe malnutrition (H)      Acute kidney injury (H)      Resolved Ambulatory Problems     Diagnosis Date Noted     Anemia 06/08/2015     HOLLEY (acute kidney injury) (H) 06/08/2015     Diastolic heart failure (H) 06/08/2015     Dyspnea 06/08/2015     Acute respiratory failure (H) 06/08/2015     Pericardial effusion 06/10/2015     Symptomatic anemia 02/21/2016     Tinnitus, bilateral      Weakness generalized      Acute respiratory failure with hypoxia (H)      Accelerated hypertension      Weakness      Acute on chronic diastolic congestive heart failure (H)      Dysuria      Urinary tract infection without hematuria, site unspecified      Paroxysmal atrial fibrillation (H)      Acute on chronic systolic and diastolic heart failure, NYHA class 3      Troponin level elevated 04/02/2018     S/p aortic valvuloplasty      Pulmonary edema 05/25/2018     Pulmonary infiltrate      Prosthetic aortic valve stenosis      Past Medical History:   Diagnosis Date     Acute non-ST elevation myocardial infarction (NSTEMI) (H) 03/29/2018     Anemia 6/8/2015     Chest pain with normal coronary angiography 04/02/2018     Chronic diastolic heart failure due to valvular disease (H)      CLL (chronic lymphocytic leukemia) (H)      DNAR (do not attempt resuscitation)      Essential hypertension      Fracture dislocation of ankle joint 11/16/2014     Hypothyroidism      Pneumonia of right lung due to infectious organism, unspecified part of lung      Severe aortic  stenosis        Reviewed at the facility.     Allergies   Allergen Reactions     Adhesive Tape-Silicones Other (See Comments)     Patient has crepy skin and 3M red dot leads causes skin to come off.  Use blue round sensitive skin leads.     Blood-Group Specific Substance Unknown     Anti-E and Warm autoantibodies present. Expect delays in blood for transfusion up to 24 hours.   Draw 2 lavender and 1 red tube for type and screen orders.     Oxycodone Other (See Comments)     Hallucinations     Vasotec [Enalaprilat] Cough         Review of Systems   No fevers or chills. No headache, lightheadedness or dizziness.  She does report feelings of tiredness and weakness.  No shortness of breath or chest pains or palpitations. Appetite is poor.  No nausea, vomiting, constipation or diarrhea. She does c/o hemorrhoids and blood in her stools. Bright red. No dysuria, frequency, burning or pain with urination.    Physical Exam   PHYSICAL EXAMINATION:  Vital signs: /67, heart rate 55, respirations 22, temperature 96.4, 92% on room air.  Current weight 122.8 pounds.s  General: Awake, Alert, oriented x3, appropriately, follows simple commands, conversant.  Appears overall fatigue.   HEENT:PERRLA, Pink conjunctiva, anicteric sclerae, moist oral mucosa  NECK: Supple, without any lymphadenopathy, or masses  CVS:  S1  S2, without murmur or gallop.   LUNG: No shortness of breath at rest.  No wheezes rales or rhonchi.  No cough.  BACK: No kyphosis of the thoracic spine  ABDOMEN: Soft, nontender to palpation, with positive bowel sounds  EXTREMITIES: Good range of motion on both upper and lower extremities, trace pedal edema, no calf tenderness  SKIN: Multiple bruises to upper and lower extremities.  Scabs along the shins.  NEUROLOGIC: Intact, pulses palpable  PSYCHIATRIC: Cognition intact.  Flat affect.            Labs:    Recent Results (from the past 240 hour(s))   Hemogram with PLT   Result Value Ref Range    .5 (HH) 4.0 -  11.0 thou/uL    RBC 2.41 (L) 3.80 - 5.40 mill/uL    Hemoglobin 6.6 (LL) 12.0 - 16.0 g/dL    Hematocrit 21.5 (L) 35.0 - 47.0 %    MCV 89 80 - 100 fL    MCH 27.4 27.0 - 34.0 pg    MCHC 30.7 (L) 32.0 - 36.0 g/dL    RDW 17.1 (H) 11.0 - 14.5 %    Platelets 254 140 - 440 thou/uL    MPV 11.1 8.5 - 12.5 fL   Basic metabolic panel   Result Value Ref Range    Sodium 131 (L) 136 - 145 mmol/L    Potassium 4.2 3.5 - 5.0 mmol/L    Chloride 97 (L) 98 - 107 mmol/L    CO2 23 22 - 31 mmol/L    Anion Gap, Calculation 11 5 - 18 mmol/L    Glucose 83 70 - 125 mg/dL    Calcium 7.5 (L) 8.5 - 10.5 mg/dL    BUN 82 (H) 8 - 28 mg/dL    Creatinine 1.66 (H) 0.60 - 1.10 mg/dL    GFR MDRD Af Amer 35 (L) >60 mL/min/1.73m2    GFR MDRD Non Af Amer 29 (L) >60 mL/min/1.73m2   Magnesium   Result Value Ref Range    Magnesium 2.5 1.8 - 2.6 mg/dL   Crossmatch   Result Value Ref Range    Crossmatch COMPATIBLE     Blood Expiration Date [id number]     Unit Type O Pos     Unit Number [id number]     Status Transfused     Component Red Blood Cells     PRODUCT CODE [id number]     Issue Date and Time [id number]     Blood Type 5100     CODING SYSTEM [id number]    Comprehensive Metabolic Panel   Result Value Ref Range    Sodium 129 (L) 136 - 145 mmol/L    Potassium 3.6 3.5 - 5.0 mmol/L    Chloride 100 98 - 107 mmol/L    CO2 22 22 - 31 mmol/L    Anion Gap, Calculation 7 5 - 18 mmol/L    Glucose 80 70 - 125 mg/dL    BUN 70 (H) 8 - 28 mg/dL    Creatinine 1.27 (H) 0.60 - 1.10 mg/dL    GFR MDRD Af Amer 48 (L) >60 mL/min/1.73m2    GFR MDRD Non Af Amer 40 (L) >60 mL/min/1.73m2    Bilirubin, Total 0.5 0.0 - 1.0 mg/dL    Calcium 7.5 (L) 8.5 - 10.5 mg/dL    Protein, Total 4.4 (L) 6.0 - 8.0 g/dL    Albumin 2.3 (L) 3.5 - 5.0 g/dL    Alkaline Phosphatase 166 (H) 45 - 120 U/L    AST 25 0 - 40 U/L    ALT 11 0 - 45 U/L   HM2(CBC w/o Differential)   Result Value Ref Range    .4 (HH) 4.0 - 11.0 thou/uL    RBC 3.50 (L) 3.80 - 5.40 mill/uL    Hemoglobin 9.3 (L) 12.0 - 16.0  g/dL    Hematocrit 30.4 (L) 35.0 - 47.0 %    MCV 87 80 - 100 fL    MCH 26.6 (L) 27.0 - 34.0 pg    MCHC 30.6 (L) 32.0 - 36.0 g/dL    RDW 17.4 (H) 11.0 - 14.5 %    Platelets 225 140 - 440 thou/uL    MPV 10.9 8.5 - 12.5 fL   Magnesium   Result Value Ref Range    Magnesium 2.5 1.8 - 2.6 mg/dL   Crossmatch   Result Value Ref Range    Crossmatch COMPATIBLE     Blood Expiration Date [id number]     Unit Type O Pos     Unit Number [id number]     Status Transfused     Component Red Blood Cells     PRODUCT CODE [id number]     Issue Date and Time [id number]     Blood Type 5100     CODING SYSTEM [id number]    Magnesium   Result Value Ref Range    Magnesium 2.6 1.8 - 2.6 mg/dL   Comprehensive Metabolic Panel   Result Value Ref Range    Sodium 135 (L) 136 - 145 mmol/L    Potassium 3.8 3.5 - 5.0 mmol/L    Chloride 102 98 - 107 mmol/L    CO2 25 22 - 31 mmol/L    Anion Gap, Calculation 8 5 - 18 mmol/L    Glucose 80 70 - 125 mg/dL    BUN 59 (H) 8 - 28 mg/dL    Creatinine 1.12 (H) 0.60 - 1.10 mg/dL    GFR MDRD Af Amer 56 (L) >60 mL/min/1.73m2    GFR MDRD Non Af Amer 46 (L) >60 mL/min/1.73m2    Bilirubin, Total 0.5 0.0 - 1.0 mg/dL    Calcium 7.7 (L) 8.5 - 10.5 mg/dL    Protein, Total 4.2 (L) 6.0 - 8.0 g/dL    Albumin 2.3 (L) 3.5 - 5.0 g/dL    Alkaline Phosphatase 182 (H) 45 - 120 U/L    AST 22 0 - 40 U/L    ALT 10 0 - 45 U/L   HM1 (CBC with Diff)   Result Value Ref Range    .9 (HH) 4.0 - 11.0 thou/uL    RBC 3.60 (L) 3.80 - 5.40 mill/uL    Hemoglobin 10.3 (L) 12.0 - 16.0 g/dL    Hematocrit 32.0 (L) 35.0 - 47.0 %    MCV 89 80 - 100 fL    MCH 28.6 27.0 - 34.0 pg    MCHC 32.2 32.0 - 36.0 g/dL    RDW 17.9 (H) 11.0 - 14.5 %    Platelets 235 140 - 440 thou/uL    MPV 10.8 8.5 - 12.5 fL   Manual Differential   Result Value Ref Range    Total Neutrophils % 6 (L) 50 - 70 %    Lymphocytes % 90 (H) 20 - 40 %    Monocytes % 4 2 - 10 %    Eosinophils %  0 0 - 6 %    Basophils % 0 0 - 2 %    Total Neutrophils Absolute 16.7 (H) 2.0 - 7.7  thou/ul    Lymphocytes Absolute 250.1 (H) 0.8 - 4.4 thou/uL    Monocytes Absolute 11.1 (H) 0.0 - 0.9 thou/uL    Eosinophils Absolute 0.0 0.0 - 0.4 thou/uL    Basophils Absolute 0.0 0.0 - 0.2 thou/uL    Smudge Cells Present (!) None Seen    Platelet Estimate Normal Normal    Polychromasia 1+ (!) Negative   Echo Complete   Result Value Ref Range    LV volume diastolic 75 46 - 106 cm3    LV volume systolic 33 14 - 42 cm3    HR 68 bpm    HR 63 bpm    IVSd 1.4 0.6 - 0.9 cm    LVIDd 3.9 3.8 - 5.2 cm    LVIDs 2.6 2.2 - 3.5 cm    LVOT diam 1.8 cm    LVOT mean gradient 1 mmHg    LVOT peak VTI 14.1 cm    LVOT mean lissette 54.2 cm/s    LVOT peak lissette 79.7 cm/s    LVOT peak gradient 3 mmHg    LV PWd 1.5 0.6 - 0.9 cm    MV E' lat lissette 3.7 cm/s    MV E' med lissette 3.51 cm/s    AV mean lissette 174 cm/s    AV mean gradient 15 mmHg    AV VTI 49 cm    AV peak lissette 278 cm/s    AO root 2.9 cm    LA size 4.1 cm    MV decel time 433 ms    MV peak A lissette 143 cm/s    MV peak E lissette 73.5 cm/s    AZ peak lissette 106 cm/s    AZ peak gradient 4 mmHg    PV mean lissette 104 cm/s    PV mean gradient 6 mmHg    PV VTI 21.3 cm    PV VTI 14.3 cm    PV VTI 28.3 cm    PV peak velocity 179 cm/s    RVOT mean lissette 62.2 cm/s    RVOT mean gradient 2 mmHg    RVOT peak VTI 15 cm    RVOT mean lissette 62.2 cm/s    RVOT mean gradient 2 mmHg    RVOT peak VTI 15 cm    RVOT peak lissette 85.4 cm/s    RVOT peak gradient 3 mmHg    TR peak lissette 320 cm/s    LA area 2 16.4 cm2    LA area 1 19.7 cm2    LA length 4.7 cm    BSA 1.57 m2    Hieght 62 in    Weight 2000 lbs    /40 mmHg    IVS/PW ratio 0.9     TR peak gradent 41.0 mmHg    LV FS 33.3 28 - 44 %    Echo LVEF calculated 56 55 - 75 %    LA volume 58.4 mL    LV mass 212.9 g    AV area 0.7 cm2    AV DIM IND lissette 0.3     PV peak gradient 12.8 mmHg    MV E/A Ratio 0.5     LVOT area 2.54 cm2    LVOT SV 35.9 cm3    AV peak gradient 30.9 mmHg    LV systolic volume index 21.0 11 - 31 cm3/m2    LV diastolic volume index 47.8 34 - 74 cm3/m2    LA volume  index 37.2 mL/m2    LV mass index 135.6 g/m2    LV SVi 22.8 ml/m2    MV med E/e' ratio 20.9     MV lat E/e' ratio 19.9     Height 62.0 in    Weight 125 lbs    MV Avg E/e' Ratio 20.4 cm/s    AV DIM IND VTI 0.3    Basic Metabolic Panel   Result Value Ref Range    Sodium 135 (L) 136 - 145 mmol/L    Potassium 4.2 3.5 - 5.0 mmol/L    Chloride 98 98 - 107 mmol/L    CO2 24 22 - 31 mmol/L    Anion Gap, Calculation 13 5 - 18 mmol/L    Glucose 81 70 - 125 mg/dL    Calcium 7.8 (L) 8.5 - 10.5 mg/dL    BUN 46 (H) 8 - 28 mg/dL    Creatinine 1.33 (H) 0.60 - 1.10 mg/dL    GFR MDRD Af Amer 46 (L) >60 mL/min/1.73m2    GFR MDRD Non Af Amer 38 (L) >60 mL/min/1.73m2   HM1 (CBC with Diff)   Result Value Ref Range    .9 (HH) 4.0 - 11.0 thou/uL    RBC 3.02 (L) 3.80 - 5.40 mill/uL    Hemoglobin 8.5 (L) 12.0 - 16.0 g/dL    Hematocrit 28.6 (L) 35.0 - 47.0 %    MCV 95 80 - 100 fL    MCH 28.1 27.0 - 34.0 pg    MCHC 29.7 (L) 32.0 - 36.0 g/dL    RDW 18.0 (H) 11.0 - 14.5 %    Platelets 169 140 - 440 thou/uL    MPV 10.5 8.5 - 12.5 fL   Manual Differential   Result Value Ref Range    Total Neutrophils % 11 (L) 50 - 70 %    Lymphocytes % 89 (H) 20 - 40 %    Monocytes % 1 (L) 2 - 10 %    Eosinophils %  0 0 - 6 %    Basophils % 0 0 - 2 %    Total Neutrophils Absolute 28.3 (H) 2.0 - 7.7 thou/ul    Lymphocytes Absolute 240.2 (H) 0.8 - 4.4 thou/uL    Monocytes Absolute 1.3 (H) 0.0 - 0.9 thou/uL    Eosinophils Absolute 0.0 0.0 - 0.4 thou/uL    Basophils Absolute 0.0 0.0 - 0.2 thou/uL    Smudge Cells Present (!) None Seen    Platelet Estimate Normal Normal    Ovalocytes 1+ (!) Negative    Polychromasia 1+ (!) Negative       Assesemnt /Plan:   1. S/P TAVR (transcatheter aortic valve replacement)     2. Atrial fibrillation (H)     3. Anemia     4. Chronic lymphoid leukemia (H)     5. Leukocytosis       Patient status post TAVR. She has had ongoing issues with anemia.  She is undergone multiple blood transfusions.  She has a history of CLL with  lymphocytosis.  She is followed by hematology/oncology.  White blood cell count continues to be in the upper 200s.  She continues on Aredia.  She has completed her steroid treatment.  Hemoglobin 8.5.  Platelets 169,000.  Reports overall fatigue.  No shortness of breath.  Edema has improved.  Weights are stable.  She did go for echocardiogram today.  Patient continues in therapy to increase strength and endurance.    Electronically signed by: Susie Walsh, SANTOS

## 2021-06-19 NOTE — PROGRESS NOTES
Code Status:  UNKNOWN  Visit Type: Problem Visit     Facility:  [facility] [[id number]]         Facility Type: SNF (Skilled Nursing Facility, TCU)    History of Present Illness: Brandi Patiño is a 89 y.o. female who I am seeing today for follow up on the TCU. Patient originally admitted to the hospital for elective transcatheter aortic valve replacement on 6/12/2018.  She has underlying severe aortic stenosis.  She was previously in the TCU for pneumonia.  She also has a history of CHF.  Posterior Soham procedure have postoperative anemia.  She did undergo a total of 3 units of packed red blood cells.  Her hemoglobin initially rebounded to 9.6 but has been drifting downward.  Previously was 7.8 and today 6.5.  She has underlying CLL.  She is followed by Dr. Lockhart for oncology and was requested to be sent to hospital. Her WBC was 222.  She did recently readmit to hospital on 6/19/18 with low hemoglobin. Upon admit she did have melenic stools with hemoglobin of 6.5. She did see her oncologist while hospitalized and she was transfused with 3 units of PRBCs. Her hemoglobin rebounded to 7.9. It was recommended she continue aspirin and Plavix. She continues on PPI. She did resume her CLL immunosuppressive therapy.  Patient also with recent C. difficile.  She continues on oral vancomycin until July 1.    Today she is sitting up in a bedside chair.  Nursing staff report increased shortness of breath.  Patient very short of breath just transferring from chair to bed.  Dyspnea at rest.  She continues to be fluid overloaded.  2-3+ edema on lower extremities.  Lung sounds with crackles in the lower lobes.  She is status post TAVR.  She continues on torsemide 40 mg two times a day with recent addition of Metolazone 5 mg.  BNP is trending upward.  At 563.  She does have underlying CLL.  She just recently resumed her improvement.  White count trending upward at 249.5.  Hemoglobin trending downward at 7.3.   She is followed by Dr. Lopez with hem/onc. Patient appears not to be responding to treatment appears overall very tired and fatigued.          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     Melena      TACO (transfusion associated circulatory overload)      Anemia 07/02/2018     Resolved Ambulatory Problems     Diagnosis Date Noted     Anemia 06/08/2015     HOLLEY (acute kidney injury) (H) 06/08/2015     Diastolic heart failure (H) 06/08/2015     Dyspnea 06/08/2015     Acute respiratory failure (H) 06/08/2015     Pericardial effusion 06/10/2015     Symptomatic anemia 02/21/2016     Tinnitus, bilateral      Weakness generalized      Acute respiratory failure with hypoxia (H)      Accelerated hypertension      Weakness      Acute on chronic diastolic congestive heart failure (H)      Dysuria      Urinary tract infection without hematuria, site unspecified      Paroxysmal atrial fibrillation (H)      Acute on chronic systolic and diastolic heart failure, NYHA class 3      Troponin level elevated 04/02/2018     S/p aortic valvuloplasty      Pulmonary edema 05/25/2018     Pulmonary infiltrate      Prosthetic aortic valve stenosis      Past Medical History:   Diagnosis Date     Acute non-ST elevation myocardial infarction (NSTEMI) (H) 03/29/2018     Anemia 6/8/2015     Chest pain with normal coronary angiography 04/02/2018     Chronic diastolic heart failure due to valvular disease (H)      CLL (chronic lymphocytic leukemia) (H)      DNAR (do not attempt resuscitation)      Essential hypertension      Fracture dislocation of ankle joint  11/16/2014     Hypothyroidism      Pneumonia of right lung due to infectious organism, unspecified part of lung      Severe aortic stenosis        Reviewed at the facility.     Allergies   Allergen Reactions     Blood-Group Specific Substance Unknown     Anti-E and Warm autoantibodies present. Expect delays in blood for transfusion up to 24 hours.   Draw 2 lavender and 1 red tube for type and screen orders.     Oxycodone Other (See Comments)     Hallucinations     Vasotec [Enalaprilat] Cough         Review of Systems   No fevers or chills. No headache, lightheadedness or dizziness.  She does report feelings of tiredness and weakness.  Very SOB, no chest pains or palpitations. Appetite is poor.  No nausea, vomiting, constipation or diarrhea. She does c/o hemorrhoids and blood in her stools. Bright red. No dysuria, frequency, burning or pain with urination.    Physical Exam   PHYSICAL EXAMINATION:  Vital signs: /44, heart rate 52, respirations 16, temperature 96.2, O2 sat 92% on room air, current weight 123 pounds.  General: Awake, Alert, oriented x3, appropriately, follows simple commands, conversant.  Appears overall fatigue.   HEENT:PERRLA, Pink conjunctiva, anicteric sclerae, moist oral mucosa  NECK: Supple, without any lymphadenopathy, or masses  CVS:  S1  S2, without murmur or gallop.   LUNG: Shortness of breath with exertion.  Extreme shortness of breath transferring from bedside chair to wheelchair.  Crackles in the lower lobes.  BACK: No kyphosis of the thoracic spine  ABDOMEN: Soft, nontender to palpation, with positive bowel sounds  EXTREMITIES: Good range of motion on both upper and lower extremities, 3+ pedal edema, no calf tenderness  SKIN: Warm and dry, no rashes or erythema noted  NEUROLOGIC: Intact, pulses palpable  PSYCHIATRIC: Cognition intact            Labs:    Recent Results (from the past 240 hour(s))   Hemoglobin   Result Value Ref Range    Hemoglobin 7.4 (L) 12.0 - 16.0 g/dL   Basic  Metabolic Panel   Result Value Ref Range    Sodium 134 (L) 136 - 145 mmol/L    Potassium 4.2 3.5 - 5.0 mmol/L    Chloride 103 98 - 107 mmol/L    CO2 20 (L) 22 - 31 mmol/L    Anion Gap, Calculation 11 5 - 18 mmol/L    Glucose 75 70 - 125 mg/dL    Calcium 7.6 (L) 8.5 - 10.5 mg/dL    BUN 50 (H) 8 - 28 mg/dL    Creatinine 1.28 (H) 0.60 - 1.10 mg/dL    GFR MDRD Af Amer 48 (L) >60 mL/min/1.73m2    GFR MDRD Non Af Amer 39 (L) >60 mL/min/1.73m2   Type and Screen   Result Value Ref Range    ABORh O POS     Antibody Screen Negative Negative   Crossmatch   Result Value Ref Range    Crossmatch COMPATIBLE     Blood Expiration Date 20180706235900     Unit Type O Neg     Unit Number [id number]     Status Transfused     Component Red Blood Cells     PRODUCT CODE D6184J58     Issue Date and Time [id number]     Blood Type 9500     CODING SYSTEM [id number]    Potassium - Next AM   Result Value Ref Range    Potassium 4.0 3.5 - 5.0 mmol/L   Creatinine   Result Value Ref Range    Creatinine 1.42 (H) 0.60 - 1.10 mg/dL    GFR MDRD Af Amer 42 (L) >60 mL/min/1.73m2    GFR MDRD Non Af Amer 35 (L) >60 mL/min/1.73m2   Hemoglobin   Result Value Ref Range    Hemoglobin 7.9 (L) 12.0 - 16.0 g/dL   Basic Metabolic Panel   Result Value Ref Range    Sodium 132 (L) 136 - 145 mmol/L    Potassium 5.3 (H) 3.5 - 5.0 mmol/L    Chloride 101 98 - 107 mmol/L    CO2 19 (L) 22 - 31 mmol/L    Anion Gap, Calculation 12 5 - 18 mmol/L    Glucose 129 (H) 70 - 125 mg/dL    Calcium 8.3 (L) 8.5 - 10.5 mg/dL    BUN 57 (H) 8 - 28 mg/dL    Creatinine 1.45 (H) 0.60 - 1.10 mg/dL    GFR MDRD Af Amer 41 (L) >60 mL/min/1.73m2    GFR MDRD Non Af Amer 34 (L) >60 mL/min/1.73m2   Calcium, Ionized, Measured   Result Value Ref Range    Calcium, Ionized Measured 1.07 (L) 1.11 - 1.30 mmol/L    Calcium, Ionized pH 7.4 1.08 (L) 1.11 - 1.30 mmol/L    pH 7.43 7.35 - 7.45   HM2(CBC w/o Differential)   Result Value Ref Range    .5 (HH) 4.0 - 11.0 thou/uL    RBC 3.00 (L) 3.80 -  5.40 mill/uL    Hemoglobin 8.6 (L) 12.0 - 16.0 g/dL    Hematocrit 28.3 (L) 35.0 - 47.0 %    MCV 94 80 - 100 fL    MCH 28.7 27.0 - 34.0 pg    MCHC 30.4 (L) 32.0 - 36.0 g/dL    RDW 17.3 (H) 11.0 - 14.5 %    Platelets 190 140 - 440 thou/uL    MPV 12.0 8.5 - 12.5 fL   BNP(B-type Natriuretic Peptide)   Result Value Ref Range     (H) 0 - 167 pg/mL   Basic Metabolic Panel   Result Value Ref Range    Sodium 131 (L) 136 - 145 mmol/L    Potassium 4.5 3.5 - 5.0 mmol/L    Chloride 96 (L) 98 - 107 mmol/L    CO2 22 22 - 31 mmol/L    Anion Gap, Calculation 13 5 - 18 mmol/L    Glucose 124 70 - 125 mg/dL    Calcium 8.1 (L) 8.5 - 10.5 mg/dL    BUN 62 (H) 8 - 28 mg/dL    Creatinine 1.63 (H) 0.60 - 1.10 mg/dL    GFR MDRD Af Amer 36 (L) >60 mL/min/1.73m2    GFR MDRD Non Af Amer 30 (L) >60 mL/min/1.73m2   BNP(B-type Natriuretic Peptide)   Result Value Ref Range     (H) 0 - 167 pg/mL   Basic Metabolic Panel   Result Value Ref Range    Sodium 128 (L) 136 - 145 mmol/L    Potassium 3.3 (L) 3.5 - 5.0 mmol/L    Chloride 90 (L) 98 - 107 mmol/L    CO2 26 22 - 31 mmol/L    Anion Gap, Calculation 12 5 - 18 mmol/L    Glucose 77 70 - 125 mg/dL    Calcium 7.7 (L) 8.5 - 10.5 mg/dL    BUN 88 (H) 8 - 28 mg/dL    Creatinine 1.86 (H) 0.60 - 1.10 mg/dL    GFR MDRD Af Amer 31 (L) >60 mL/min/1.73m2    GFR MDRD Non Af Amer 26 (L) >60 mL/min/1.73m2   BNP(B-type Natriuretic Peptide)   Result Value Ref Range     (H) 0 - 167 pg/mL   HM2(CBC w/o Differential)   Result Value Ref Range    .5 (HH) 4.0 - 11.0 thou/uL    RBC 2.56 (L) 3.80 - 5.40 mill/uL    Hemoglobin 7.3 (L) 12.0 - 16.0 g/dL    Hematocrit 23.3 (L) 35.0 - 47.0 %    MCV 91 80 - 100 fL    MCH 28.5 27.0 - 34.0 pg    MCHC 31.3 (L) 32.0 - 36.0 g/dL    RDW 17.2 (H) 11.0 - 14.5 %    Platelets 262 140 - 440 thou/uL    MPV 10.9 8.5 - 12.5 fL   HM2 (CBC W/O DIFF)   Result Value Ref Range    .2 (HH) 4.0 - 11.0 thou/uL    RBC 2.65 (L) 3.80 - 5.40 mill/uL    Hemoglobin 7.4 (L) 12.0  - 16.0 g/dL    Hematocrit 23.4 (L) 35.0 - 47.0 %    MCV 88 80 - 100 fL    MCH 27.9 27.0 - 34.0 pg    MCHC 31.6 (L) 32.0 - 36.0 g/dL    RDW 17.2 (H) 11.0 - 14.5 %    Platelets 314 140 - 440 thou/uL    MPV 11.3 8.5 - 12.5 fL   INR   Result Value Ref Range    INR 1.13 (H) 0.90 - 1.10   Lactic Acid   Result Value Ref Range    Lactic Acid 0.8 0.5 - 2.2 mmol/L   Type and Screen   Result Value Ref Range    ABORh O POS     Antibody Screen Negative Negative   POCT occult blood stool   Result Value Ref Range    POC Fecal Occult Bld Positive (!) Negative    Lot Number [id number]     Expiration Date 7-20     Diluent/Developer Lot Number [id number]     Diluent/Developer Expiration Date 2021-11     Pos Control Valid Control Valid Control    Neg Control Valid Control Valid Control   Troponin I   Result Value Ref Range    Troponin I 0.08 0.00 - 0.29 ng/mL   Crossmatch   Result Value Ref Range    Crossmatch COMPATIBLE     Blood Expiration Date 17209221009013     Unit Type O Pos     Unit Number [id number]     Status Ready     Component Red Blood Cells     PRODUCT CODE [id number]     Blood Type 5100     CODING SYSTEM IRXE010    Crossmatch   Result Value Ref Range    Crossmatch COMPATIBLE     Blood Expiration Date [id number]     Unit Type O Pos     Unit Number [id number]     Status Ready     Component Red Blood Cells     PRODUCT CODE [id number]     Blood Type 5100     CODING SYSTEM [id number]        Assesemnt /Plan:   1. Congestive heart failure (H)     2. S/P TAVR (transcatheter aortic valve replacement)     3. Chronic lymphoid leukemia (H)     4. Leukocytosis     5. Anemia     6. C. difficile colitis     7. Atrial fibrillation (H)         Patient with continued fluid overload.  Increasing shortness of breath even from transferring from bedside chair to wheelchair.  She continues on torsemide 40 mg twice daily.  Recent addition of 5 mg of metolazone daily.  Increasing shortness of breath.  She does have underlying CLL.  Increasing white  count of 249.5.  She did recently resume her improved workup.  She is followed by hematology/oncology.  Chronic anemia.  Hemoglobin trending downward.  She does have some hemorrhoids with bleeding.  Recent C. difficile.  She will continue with vancomycin orally.  Atrial fib rate controlled.  She has been unable to tolerate anticoagulation secondary to her chronic anemia.  Overall very fatigued.  BNP trending upward.  We will send the hospital for evaluation.        Electronically signed by: Susie Walsh CNP

## 2021-06-19 NOTE — PROGRESS NOTES
Code Status:  UNKNOWN  Visit Type: Problem Visit     Facility:  [facility] [[id number]]         Facility Type: SNF (Skilled Nursing Facility, TCU)    History of Present Illness: Brandi Patiño is a 89 y.o. female who I am seeing today for follow up on the TCU. Patient originally admitted to the hospital for elective transcatheter aortic valve replacement on 6/12/2018.  She has underlying severe aortic stenosis.  She was previously in the TCU for pneumonia.  She also has a history of CHF.  Posterior Soham procedure have postoperative anemia.  She did undergo a total of 3 units of packed red blood cells.  Her hemoglobin initially rebounded to 9.6 but has been drifting downward.  Previously was 7.8 and today 6.5.  She has underlying CLL.  She is followed by Dr. Lockhart for oncology and was requested to be sent to hospital. Her WBC was 222.  She did recently readmit to hospital on 6/19/18 with low hemoglobin. Upon admit she did have melenic stools with hemoglobin of 6.5. She did see her oncologist while hospitalized and she was transfused with 3 units of PRBCs. Her hemoglobin rebounded to 7.9. It was recommended she continue aspirin and Plavix. She continues on PPI. She did resume her CLL immunosuppressive therapy.  Patient also with recent C. difficile.  She continues on oral vancomycin until July 1.    Today she is sitting up in a bedside chair.  Patient with continued shortness of breath. She ontinues with 3+ lower extremity edema correlated with fluid overload.  I did recently increase her diuretics.  Nursing staff reported prolapsed rectum.  This was observed today.  I did not note this at all.  She does have internal hemorrhoids palpable.  No visual bleeding from the rectum.  She does report some blood in her stools.  Her hemoglobin is stable.  Patient with extreme shortness of breath even transferring from a chair to wheelchair.  BNP now in the 800s from the 600s.    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     Melena      TACO (transfusion associated circulatory overload)      Resolved Ambulatory Problems     Diagnosis Date Noted     Anemia 06/08/2015     HOLLEY (acute kidney injury) (H) 06/08/2015     Diastolic heart failure (H) 06/08/2015     Dyspnea 06/08/2015     Acute respiratory failure (H) 06/08/2015     Pericardial effusion 06/10/2015     Symptomatic anemia 02/21/2016     Tinnitus, bilateral      Weakness generalized      Acute respiratory failure with hypoxia (H)      Accelerated hypertension      Weakness      Acute on chronic diastolic congestive heart failure (H)      Dysuria      Urinary tract infection without hematuria, site unspecified      Paroxysmal atrial fibrillation (H)      Acute on chronic systolic and diastolic heart failure, NYHA class 3      Troponin level elevated 04/02/2018     S/p aortic valvuloplasty      Pulmonary edema 05/25/2018     Pulmonary infiltrate      Prosthetic aortic valve stenosis      Past Medical History:   Diagnosis Date     Acute non-ST elevation myocardial infarction (NSTEMI) (H) 03/29/2018     Anemia 6/8/2015     Chest pain with normal coronary angiography 04/02/2018     Chronic diastolic heart failure due to valvular disease (H)      CLL (chronic lymphocytic leukemia) (H)      DNAR (do not attempt resuscitation)      Essential hypertension      Fracture dislocation of ankle joint 11/16/2014     Hypothyroidism      Pneumonia of right lung due to infectious organism, unspecified part of lung      Severe aortic stenosis        Current Outpatient Prescriptions    Medication Sig Note     aspirin 81 mg chewable tablet Chew 1 tablet (81 mg total) daily.      calcium, as carbonate, (TUMS) 200 mg calcium (500 mg) chewable tablet Chew 1 tablet (200 mg total) 3 (three) times a day for 7 days.      clonazePAM (KLONOPIN) 0.5 MG tablet Take 1 tablet (0.5 mg total) by mouth at bedtime as needed (sleep).      clopidogrel (PLAVIX) 75 mg tablet Take 1 tablet (75 mg total) by mouth daily.      diltiazem (CARDIZEM CD) 300 MG 24 hr capsule Take 300 mg by mouth daily.      ibrutinib (IMBRUVICA) 140 mg cap capsule Take by mouth daily.      levothyroxine (SYNTHROID, LEVOTHROID) 125 MCG tablet Take 125 mcg by mouth Daily at 6:00 am.       multivitamin with minerals (THERA-M) 9 mg iron-400 mcg Tab tablet Take 1 tablet by mouth daily.      omeprazole (PRILOSEC) 20 MG capsule Take 1 capsule (20 mg total) by mouth 2 (two) times a day before meals.      predniSONE (DELTASONE) 20 MG tablet Take 20 mg by mouth daily. 3/29/2018: Dose increased to 20 mg daily on 3/29/18     psyllium (METAMUCIL) 3.4 gram packet Take 1 packet by mouth daily.      torsemide (DEMADEX) 20 MG tablet Take 2 tablets (40 mg total) by mouth 2 (two) times a day at 9am and 6pm.      traZODone (DESYREL) 50 MG tablet Take 1 tablet (50 mg total) by mouth at bedtime as needed for sleep.      vancomycin (VANCOCIN) 125 mg/2.5 mL Syrg solution Take 2.5 mL (125 mg total) by mouth 4 (four) times a day at 6am, 12noon, 6pm and 10pm. for 7 days.        Allergies   Allergen Reactions     Blood-Group Specific Substance Unknown     Anti-E and Warm autoantibodies present. Expect delays in blood for transfusion up to 24 hours.   Draw 2 lavender and 1 red tube for type and screen orders.     Oxycodone Other (See Comments)     Hallucinations     Vasotec [Enalaprilat] Cough         Review of Systems   No fevers or chills. No headache, lightheadedness or dizziness.  She does report feelings of tiredness and weakness.  Very SOB, no chest pains or  palpitations. Appetite is poor.  No nausea, vomiting, constipation or diarrhea. She does c/o hemorrhoids and blood in her stools. Bright red. No dysuria, frequency, burning or pain with urination.    Physical Exam   PHYSICAL EXAMINATION:  Vital signs: /44, heart rate 58, respirations 20, O2 sat 90% on room air, temperature 98.2, current weight 125.2 pounds..   General: Awake, Alert, oriented x3, appropriately, follows simple commands, conversant.  Appears overall fatigue.   HEENT:PERRLA, Pink conjunctiva, anicteric sclerae, moist oral mucosa  NECK: Supple, without any lymphadenopathy, or masses  CVS:  S1  S2, without murmur or gallop.   LUNG: Shortness of breath with exertion.  Extreme shortness of breath transferring from bedside chair to wheelchair.  Crackles in the lower lobes.  BACK: No kyphosis of the thoracic spine  ABDOMEN: Soft, nontender to palpation, with positive bowel sounds  EXTREMITIES: Good range of motion on both upper and lower extremities, 3+ pedal edema, no calf tenderness  SKIN: Warm and dry, no rashes or erythema noted  NEUROLOGIC: Intact, pulses palpable  PSYCHIATRIC: Cognition intact            Labs:    Recent Results (from the past 240 hour(s))   Hemoglobin   Result Value Ref Range    Hemoglobin 8.4 (L) 12.0 - 16.0 g/dL   Potassium   Result Value Ref Range    Potassium 3.5 3.5 - 5.0 mmol/L   Calcium, Ionized, Measured   Result Value Ref Range    Calcium, Ionized Measured 1.02 (L) 1.11 - 1.30 mmol/L    Calcium, Ionized pH 7.4 1.03 (L) 1.11 - 1.30 mmol/L    pH 7.43 7.35 - 7.45   Potassium   Result Value Ref Range    Potassium 5.0 3.5 - 5.0 mmol/L   Hemoglobin   Result Value Ref Range    Hemoglobin 7.4 (L) 12.0 - 16.0 g/dL   Basic Metabolic Panel   Result Value Ref Range    Sodium 134 (L) 136 - 145 mmol/L    Potassium 4.2 3.5 - 5.0 mmol/L    Chloride 103 98 - 107 mmol/L    CO2 20 (L) 22 - 31 mmol/L    Anion Gap, Calculation 11 5 - 18 mmol/L    Glucose 75 70 - 125 mg/dL    Calcium 7.6 (L)  8.5 - 10.5 mg/dL    BUN 50 (H) 8 - 28 mg/dL    Creatinine 1.28 (H) 0.60 - 1.10 mg/dL    GFR MDRD Af Amer 48 (L) >60 mL/min/1.73m2    GFR MDRD Non Af Amer 39 (L) >60 mL/min/1.73m2   Type and Screen   Result Value Ref Range    ABORh O POS     Antibody Screen Negative Negative   Crossmatch   Result Value Ref Range    Crossmatch COMPATIBLE     Blood Expiration Date [id number]     Unit Type O Neg     Unit Number [id number]     Status Transfused     Component Red Blood Cells     PRODUCT CODE [id number]     Issue Date and Time [id number]     Blood Type 9500     CODING SYSTEM [id number]    Potassium - Next AM   Result Value Ref Range    Potassium 4.0 3.5 - 5.0 mmol/L   Creatinine   Result Value Ref Range    Creatinine 1.42 (H) 0.60 - 1.10 mg/dL    GFR MDRD Af Amer 42 (L) >60 mL/min/1.73m2    GFR MDRD Non Af Amer 35 (L) >60 mL/min/1.73m2   Hemoglobin   Result Value Ref Range    Hemoglobin 7.9 (L) 12.0 - 16.0 g/dL   Basic Metabolic Panel   Result Value Ref Range    Sodium 132 (L) 136 - 145 mmol/L    Potassium 5.3 (H) 3.5 - 5.0 mmol/L    Chloride 101 98 - 107 mmol/L    CO2 19 (L) 22 - 31 mmol/L    Anion Gap, Calculation 12 5 - 18 mmol/L    Glucose 129 (H) 70 - 125 mg/dL    Calcium 8.3 (L) 8.5 - 10.5 mg/dL    BUN 57 (H) 8 - 28 mg/dL    Creatinine 1.45 (H) 0.60 - 1.10 mg/dL    GFR MDRD Af Amer 41 (L) >60 mL/min/1.73m2    GFR MDRD Non Af Amer 34 (L) >60 mL/min/1.73m2   Calcium, Ionized, Measured   Result Value Ref Range    Calcium, Ionized Measured 1.07 (L) 1.11 - 1.30 mmol/L    Calcium, Ionized pH 7.4 1.08 (L) 1.11 - 1.30 mmol/L    pH 7.43 7.35 - 7.45   HM2(CBC w/o Differential)   Result Value Ref Range    .5 (HH) 4.0 - 11.0 thou/uL    RBC 3.00 (L) 3.80 - 5.40 mill/uL    Hemoglobin 8.6 (L) 12.0 - 16.0 g/dL    Hematocrit 28.3 (L) 35.0 - 47.0 %    MCV 94 80 - 100 fL    MCH 28.7 27.0 - 34.0 pg    MCHC 30.4 (L) 32.0 - 36.0 g/dL    RDW 17.3 (H) 11.0 - 14.5 %    Platelets 190 140 - 440 thou/uL    MPV 12.0 8.5 - 12.5 fL    BNP(B-type Natriuretic Peptide)   Result Value Ref Range     (H) 0 - 167 pg/mL   Basic Metabolic Panel   Result Value Ref Range    Sodium 131 (L) 136 - 145 mmol/L    Potassium 4.5 3.5 - 5.0 mmol/L    Chloride 96 (L) 98 - 107 mmol/L    CO2 22 22 - 31 mmol/L    Anion Gap, Calculation 13 5 - 18 mmol/L    Glucose 124 70 - 125 mg/dL    Calcium 8.1 (L) 8.5 - 10.5 mg/dL    BUN 62 (H) 8 - 28 mg/dL    Creatinine 1.63 (H) 0.60 - 1.10 mg/dL    GFR MDRD Af Amer 36 (L) >60 mL/min/1.73m2    GFR MDRD Non Af Amer 30 (L) >60 mL/min/1.73m2   BNP(B-type Natriuretic Peptide)   Result Value Ref Range     (H) 0 - 167 pg/mL       Assesemnt /Plan:   1. Congestive heart failure (H)     2. S/P TAVR (transcatheter aortic valve replacement)     3. Chronic lymphoid leukemia (H)     4. C. difficile colitis     5. Hemorrhoid     6. Atrial fibrillation (H)     7. Anemia       Patient with continued fluid overload.  Increasing shortness of breath even from transferring from bedside chair to wheelchair.  She was seen by cardiology on the 25th and metolazone 2.5 was added to her regimen.  She does continue on furosemide.  Will increase this to 5 mg.  BNP trending upward now in the 800s from 600.  We will follow-up with a BMP, BMP and CBC on Monday.  Continue to monitor daily weights.  Anemia of chronic disease.  She has underlying CLL.  White count continues to be in the 200s.  She is followed by oncology.  She recently restarted her Imbruvica.  Continues with chronic anemia.  Received 3 units of packed red blood cells during recent hospitalization.  Hemoglobin 8.6.  She is also having some blood in her stool.  I did feel  internal hemorrhoids.  Nursing staff noted previously a prolapsed rectum.  This was not seen on exam.  Will treat with Preparation H suppository each night until seen Monday.  She is currently being treated with oral vancomycin for C. Difficile. Atrial fib rate control was continued on Plavix and aspirin.  She is on  PPI.  She is to have a repeat echo on July 12.  Recent echo did show increased gradient could be possible micro emboli versus fluid overload.  We will continue with daily weights.    Total time spent for this visit was 60 minutes with greater than 50% of the time spent face-to-face with patient.    Electronically signed by: Susie Walsh CNP

## 2021-06-19 NOTE — PROGRESS NOTES
Code Status:  UNKNOWN  Visit Type: Problem Visit     Facility:  [facility] [[id number]]         Facility Type: SNF (Skilled Nursing Facility, TCU)    History of Present Illness: Brandi Patiño is a 89 y.o. female who I am seeing today for follow up on the TCU. Patient originally admitted to the hospital for elective transcatheter aortic valve replacement on 6/12/2018.  Patient was recently rehospitalized on 7/2/2018 with increasing shortness of breath, lower extremity edema and anemia.  She was treated for fluid overload, acute on chronic renal failure and anemia.  She did undergo transfusion.  She has underlying CLL.  She did undergo treatment of prednisone which has concluded as well as removal, which will be increased.  She has underlying severe aortic stenosis.  She also has a history of CHF.  Status post TAVR procedure with  postoperative anemia.  She did undergo a total of 3 units of packed red blood cells.  Her hemoglobin initially rebounded to 9.6 but has been drifting downward.  Previously was 7.8.   She has underlying CLL.  She is followed by Dr. Lockhart for oncology and was requested to be sent to hospital. Her WBC was 222.  She did recently readmit to hospital on 6/19/18 with low hemoglobin. Upon admit she did have melenic stools with hemoglobin of 6.5. She did see her oncologist while hospitalized and she was transfused with 3 units of PRBCs. Her hemoglobin rebounded to 7.9. It was recommended she continue aspirin and Plavix. She continues on PPI. She did resume her CLL immunosuppressive therapy.  Patient also with recent C. difficile.   she has completed her treatment.    Today patient lying in bed. She appears very fatigued. She had a follow up cardiology appt this am. Today her WBC came back at 369. She is followed by Hem/Onc with Dr. Lopez. She denies any shortness of breath or CP.       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      Persistent atrial fibrillation (H)      S/P TAVR (transcatheter aortic valve replacement) 06/12/2018     Melena      TACO (transfusion associated circulatory overload)      Anemia 07/02/2018     Diarrhea 07/02/2018     Hypokalemia      Hyponatremia      Anemia due to blood loss, acute      S/P AVR      Generalized muscle weakness      Severe malnutrition (H)      Acute kidney injury (H)      Resolved Ambulatory Problems     Diagnosis Date Noted     Anemia 06/08/2015     HOLLEY (acute kidney injury) (H) 06/08/2015     Diastolic heart failure (H) 06/08/2015     Dyspnea 06/08/2015     Acute respiratory failure (H) 06/08/2015     Pericardial effusion 06/10/2015     Symptomatic anemia 02/21/2016     Tinnitus, bilateral      Weakness generalized      Acute respiratory failure with hypoxia (H)      Accelerated hypertension      Weakness      Acute on chronic diastolic congestive heart failure (H)      Dysuria      Urinary tract infection without hematuria, site unspecified      Paroxysmal atrial fibrillation (H)      Acute on chronic systolic and diastolic heart failure, NYHA class 3      Troponin level elevated 04/02/2018     S/p aortic valvuloplasty      Pulmonary edema 05/25/2018     Pulmonary infiltrate      Prosthetic aortic valve stenosis      Past Medical History:   Diagnosis Date     Acute non-ST elevation myocardial infarction (NSTEMI) (H) 03/29/2018     Anemia 6/8/2015     Chest pain with normal coronary angiography 04/02/2018     Chronic diastolic heart failure due to valvular disease (H)      CLL (chronic lymphocytic leukemia) (H)      DNAR (do not attempt resuscitation)      Essential hypertension      Fracture dislocation of ankle joint 11/16/2014     Hypothyroidism       Pneumonia of right lung due to infectious organism, unspecified part of lung      Severe aortic stenosis      Current Outpatient Prescriptions   Medication Sig     aspirin 81 mg chewable tablet Chew 1 tablet (81 mg total) daily.     calcium, as carbonate, (TUMS) 200 mg calcium (500 mg) chewable tablet Chew 1 tablet (200 mg total) 3 (three) times a day.     clonazePAM (KLONOPIN) 0.5 MG tablet Take 1 tablet (0.5 mg total) by mouth at bedtime as needed (sleep).     clopidogrel (PLAVIX) 75 mg tablet Take 1 tablet (75 mg total) by mouth daily.     diltiazem (CARDIZEM CD) 300 MG 24 hr capsule Take 300 mg by mouth daily.     ibrutinib (IMBRUVICA) 140 mg cap capsule Take 140 mg by mouth at bedtime.      levothyroxine (SYNTHROID, LEVOTHROID) 125 MCG tablet Take 125 mcg by mouth Daily at 6:00 am.      multivitamin with minerals (THERA-M) 9 mg iron-400 mcg Tab tablet Take 1 tablet by mouth daily.     omeprazole (PRILOSEC) 20 MG capsule Take 1 capsule (20 mg total) by mouth 2 (two) times a day before meals.     psyllium (METAMUCIL) 3.4 gram packet Take 1 packet by mouth daily.     torsemide (DEMADEX) 20 MG tablet Take 2 tablets (40 mg total) by mouth daily.     traZODone (DESYREL) 50 MG tablet Take 1 tablet (50 mg total) by mouth at bedtime as needed for sleep.         Allergies   Allergen Reactions     Adhesive Tape-Silicones Other (See Comments)     Patient has crepy skin and 3M red dot leads causes skin to come off.  Use blue round sensitive skin leads.     Blood-Group Specific Substance Unknown     Anti-E and Warm autoantibodies present. Expect delays in blood for transfusion up to 24 hours.   Draw 2 lavender and 1 red tube for type and screen orders.     Oxycodone Other (See Comments)     Hallucinations     Vasotec [Enalaprilat] Cough         Review of Systems   No fevers or chills. No headache, lightheadedness or dizziness.  She does report feelings of tiredness and weakness.  No shortness of breath or chest pains or  palpitations. Appetite is poor.  No nausea, vomiting, constipation or diarrhea.  No dysuria, frequency, burning or pain with urination.    Physical Exam   PHYSICAL EXAMINATION:  Vital signs: /63, P 63, R 18, T 97.8, O2 97%.  Current weight 119.4 pounds   General: Awake, Alert, appropriately, follows simple commands, conversant.  Appears overall fatigue.   HEENT:PERRLA, Pink conjunctiva, anicteric sclerae, moist oral mucosa  NECK: Supple, without any lymphadenopathy, or masses  CVS:  S1  S2, without murmur or gallop.   LUNG: No shortness of breath at rest.    BACK: No kyphosis of the thoracic spine  ABDOMEN: Soft, nontender to palpation, with positive bowel sounds  EXTREMITIES: Good range of motion on both upper and lower extremities, 1+pedal edema, no calf tenderness  SKIN: Multiple bruises to upper and lower extremities.  Scabs along the shins.  NEUROLOGIC: Intact, pulses palpable  PSYCHIATRIC: Cognition intact.  Flat affect.            Labs:    Recent Results (from the past 240 hour(s))   Echo Complete   Result Value Ref Range    LV volume diastolic 75 46 - 106 cm3    LV volume systolic 33 14 - 42 cm3    HR 68 bpm    HR 63 bpm    IVSd 1.4 0.6 - 0.9 cm    LVIDd 3.9 3.8 - 5.2 cm    LVIDs 2.6 2.2 - 3.5 cm    LVOT diam 1.8 cm    LVOT mean gradient 1 mmHg    LVOT peak VTI 14.1 cm    LVOT mean lissette 54.2 cm/s    LVOT peak lissette 79.7 cm/s    LVOT peak gradient 3 mmHg    LV PWd 1.5 0.6 - 0.9 cm    MV E' lat lissette 3.7 cm/s    MV E' med lissette 3.51 cm/s    AV mean lissette 174 cm/s    AV mean gradient 15 mmHg    AV VTI 49 cm    AV peak lissette 278 cm/s    AO root 2.9 cm    LA size 4.1 cm    MV decel time 433 ms    MV peak A lissette 143 cm/s    MV peak E lissette 73.5 cm/s    WY peak lissette 106 cm/s    WY peak gradient 4 mmHg    PV mean lissette 104 cm/s    PV mean gradient 6 mmHg    PV VTI 21.3 cm    PV VTI 14.3 cm    PV VTI 28.3 cm    PV peak velocity 179 cm/s    RVOT mean lissette 62.2 cm/s    RVOT mean gradient 2 mmHg    RVOT peak VTI 15 cm    RVOT mean  lissette 62.2 cm/s    RVOT mean gradient 2 mmHg    RVOT peak VTI 15 cm    RVOT peak lissette 85.4 cm/s    RVOT peak gradient 3 mmHg    TR peak lissette 320 cm/s    LA area 2 16.4 cm2    LA area 1 19.7 cm2    LA length 4.7 cm    BSA 1.57 m2    Hieght 62 in    Weight 2000 lbs    /40 mmHg    IVS/PW ratio 0.9     TR peak gradent 41.0 mmHg    LV FS 33.3 28 - 44 %    Echo LVEF calculated 56 55 - 75 %    LA volume 58.4 mL    LV mass 212.9 g    AV area 0.7 cm2    AV DIM IND lissette 0.3     PV peak gradient 12.8 mmHg    MV E/A Ratio 0.5     LVOT area 2.54 cm2    LVOT SV 35.9 cm3    AV peak gradient 30.9 mmHg    LV systolic volume index 21.0 11 - 31 cm3/m2    LV diastolic volume index 47.8 34 - 74 cm3/m2    LA volume index 37.2 mL/m2    LV mass index 135.6 g/m2    LV SVi 22.8 ml/m2    MV med E/e' ratio 20.9     MV lat E/e' ratio 19.9     Height 62.0 in    Weight 125 lbs    MV Avg E/e' Ratio 20.4 cm/s    AV DIM IND VTI 0.3    Basic Metabolic Panel   Result Value Ref Range    Sodium 135 (L) 136 - 145 mmol/L    Potassium 4.2 3.5 - 5.0 mmol/L    Chloride 98 98 - 107 mmol/L    CO2 24 22 - 31 mmol/L    Anion Gap, Calculation 13 5 - 18 mmol/L    Glucose 81 70 - 125 mg/dL    Calcium 7.8 (L) 8.5 - 10.5 mg/dL    BUN 46 (H) 8 - 28 mg/dL    Creatinine 1.33 (H) 0.60 - 1.10 mg/dL    GFR MDRD Af Amer 46 (L) >60 mL/min/1.73m2    GFR MDRD Non Af Amer 38 (L) >60 mL/min/1.73m2   HM1 (CBC with Diff)   Result Value Ref Range    .9 (HH) 4.0 - 11.0 thou/uL    RBC 3.02 (L) 3.80 - 5.40 mill/uL    Hemoglobin 8.5 (L) 12.0 - 16.0 g/dL    Hematocrit 28.6 (L) 35.0 - 47.0 %    MCV 95 80 - 100 fL    MCH 28.1 27.0 - 34.0 pg    MCHC 29.7 (L) 32.0 - 36.0 g/dL    RDW 18.0 (H) 11.0 - 14.5 %    Platelets 169 140 - 440 thou/uL    MPV 10.5 8.5 - 12.5 fL   Manual Differential   Result Value Ref Range    Total Neutrophils % 11 (L) 50 - 70 %    Lymphocytes % 89 (H) 20 - 40 %    Monocytes % 1 (L) 2 - 10 %    Eosinophils %  0 0 - 6 %    Basophils % 0 0 - 2 %    Total  Neutrophils Absolute 28.3 (H) 2.0 - 7.7 thou/ul    Lymphocytes Absolute 240.2 (H) 0.8 - 4.4 thou/uL    Monocytes Absolute 1.3 (H) 0.0 - 0.9 thou/uL    Eosinophils Absolute 0.0 0.0 - 0.4 thou/uL    Basophils Absolute 0.0 0.0 - 0.2 thou/uL    Smudge Cells Present (!) None Seen    Platelet Estimate Normal Normal    Ovalocytes 1+ (!) Negative    Polychromasia 1+ (!) Negative   HM2(CBC w/o Differential)   Result Value Ref Range    .0 (HH) 4.0 - 11.0 thou/uL    RBC 2.77 (L) 3.80 - 5.40 mill/uL    Hemoglobin 7.8 (L) 12.0 - 16.0 g/dL    Hematocrit 26.2 (L) 35.0 - 47.0 %    MCV 95 80 - 100 fL    MCH 28.2 27.0 - 34.0 pg    MCHC 29.8 (L) 32.0 - 36.0 g/dL    RDW 19.3 (H) 11.0 - 14.5 %    Platelets 181 140 - 440 thou/uL    MPV 11.3 8.5 - 12.5 fL   Basic Metabolic Panel   Result Value Ref Range    Sodium 134 (L) 136 - 145 mmol/L    Potassium 3.4 (L) 3.5 - 5.0 mmol/L    Chloride 98 98 - 107 mmol/L    CO2 22 22 - 31 mmol/L    Anion Gap, Calculation 14 5 - 18 mmol/L    Glucose 64 (L) 70 - 125 mg/dL    Calcium 8.1 (L) 8.5 - 10.5 mg/dL    BUN 40 (H) 8 - 28 mg/dL    Creatinine 1.64 (H) 0.60 - 1.10 mg/dL    GFR MDRD Af Amer 36 (L) >60 mL/min/1.73m2    GFR MDRD Non Af Amer 29 (L) >60 mL/min/1.73m2   ECG 12 lead   Result Value Ref Range    SYSTOLIC BLOOD PRESSURE  mmHg    DIASTOLIC BLOOD PRESSURE  mmHg    VENTRICULAR RATE 69 BPM    ATRIAL RATE 69 BPM    P-R INTERVAL 140 ms    QRS DURATION 150 ms    Q-T INTERVAL 460 ms    QTC CALCULATION (BEZET) 492 ms    P Axis  degrees    R AXIS -39 degrees    T AXIS 129 degrees    MUSE DIAGNOSIS       Sinus rhythm with occasional Premature ventricular complexes  Left axis deviation  Left bundle branch block  Abnormal ECG  When compared with ECG of 02-JUL-2018 20:53,  Premature ventricular complexes are now Present  QRS duration has decreased         Assesemnt /Plan:   1. S/P TAVR (transcatheter aortic valve replacement)     2. Chronic lymphoid leukemia (H)     3. Diastolic congestive heart  failure (H)     4. Leukocytosis     5. Anemia     6. Atrial fibrillation (H)       S/P TAVR. She had a follow up with cardiology this am. She can start reducing her torsemide. I recently had increased briefly s/t to increased edema and crackles in her lungs. She does have underlying CHF. She continues on daily weights. No crackles today. She denies any shortness of breath. She does have 1+ edema. Overall she is very weak and fatigued. She has underlying CLL. She is followed by Hem/Onc. Chronic leukocytosis. Today her WBC is 369 up from 269.9 on 7/12. She is off her prednisone. She continues on Imbruvica. I will have nursing update Hem/Onc for further orders. Anemia chronic. Hgb 8.5. No active bleeding. She is unable to tolerate anticoagulation. Atrial fib. She continues on Plavix however cardiology suggested if continued anemia to stop it.       Electronically signed by: Susie Walsh, SANTOS

## 2021-06-19 NOTE — PROGRESS NOTES
Received intake call for home oxygen at 12:25AM/PM. Reviewed patient's chart; all documentation is in chart.   1:30PM- Called to offer choice and patient is okay with [facility] setting them up. Discussed equipment with patient and informed them that transport would provide oxygen to get patient home and we will meet patient at home with our equipment to go through teaching with patient and staff if available.   Spoke with [facility], confirmed we received the order and patient will be discharging to [facility] with transport at 3pm. I will have our  meet patient at home at 3:30pm with oxygen equipment.

## 2021-06-19 NOTE — PROGRESS NOTES
Code Status:  UNKNOWN  Visit Type: Discharge Summary     Facility:  [facility] [[id number]]         Facility Type: SNF (Skilled Nursing Facility, TCU)    History of Present Illness: Brandi Patiño is a 89 y.o. female who I am discharging from the TCU. Patient originally admitted to the hospital for elective transcatheter aortic valve replacement on 6/12/2018.  Patient was recently rehospitalized on 7/2/2018 with increasing shortness of breath, lower extremity edema and anemia.  She was treated for fluid overload, acute on chronic renal failure and anemia.  She did undergo transfusion.  She has underlying CLL.  She did undergo treatment of prednisone initially. Her Imbruvica was resumed during her TCU stay due to elevated WBC. Her white count continues to be in the 300s. She is followed by Hem/ONC (Dr Lopez). Labs are pending today. She has underlying severe aortic stenosis.  She also has a history of CHF.  Status post TAVR procedure with  postoperative anemia.  She has underwent 3 transfusions since admit.  Her hemoglobin initially rebounded to 9.6 but has been drifting downward.  Previously was 7.8. She did readmit to hospital on 6/19/18 with low hemoglobin. Upon admit she did have melenic stools with hemoglobin of 6.5. She did see her oncologist while hospitalized and she was transfused with 3 units of PRBCs. Her hemoglobin rebounded to 7.9. It was recommended she continue aspirin and Plavix. However cardiology recently recommended if continued decline would suggest discontinuing.  She continues on PPI.  Patient also with recent C. difficile. She has completed her treatment. Today she complains of burning and pain in her left foot. No redness or warmth. Sensitivity with touch. She does have hx of gout. She continues on antibiotic for pink eye.     Overall pt with continued decline. She is very frail. Increasing weakness requiring assist of 2 with transfers. She denies any shortness of breath  or CP. She continues to report fatigue. She is going in for another blood transfusion in am. She is due to transfer to LTC in am. Today I talk with her about hospice. She is somewhat reluctant. We discuss further treatment of her CLL. She would like to talk it over with her daughter.     .  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     Melena      TACO (transfusion associated circulatory overload)      Anemia 07/02/2018     Diarrhea 07/02/2018     Hypokalemia      Hyponatremia      Anemia due to blood loss, acute      S/P AVR      Generalized muscle weakness      Severe malnutrition (H)      Acute kidney injury (H)      Resolved Ambulatory Problems     Diagnosis Date Noted     Anemia 06/08/2015     HOLLEY (acute kidney injury) (H) 06/08/2015     Diastolic heart failure (H) 06/08/2015     Dyspnea 06/08/2015     Acute respiratory failure (H) 06/08/2015     Pericardial effusion 06/10/2015     Symptomatic anemia 02/21/2016     Tinnitus, bilateral      Weakness generalized      Acute respiratory failure with hypoxia (H)      Accelerated hypertension      Weakness      Acute on chronic diastolic congestive heart failure (H)      Dysuria      Urinary tract infection without hematuria, site unspecified      Paroxysmal atrial fibrillation (H)      Acute on chronic systolic and diastolic heart failure, NYHA class 3      Troponin level elevated 04/02/2018     S/p aortic valvuloplasty      Pulmonary edema 05/25/2018     Pulmonary infiltrate      Prosthetic aortic valve stenosis      Past Medical History:   Diagnosis Date     Acute non-ST  elevation myocardial infarction (NSTEMI) (H) 03/29/2018     Anemia 6/8/2015     Chest pain with normal coronary angiography 04/02/2018     Chronic diastolic heart failure due to valvular disease (H)      CLL (chronic lymphocytic leukemia) (H)      DNAR (do not attempt resuscitation)      Essential hypertension      Fracture dislocation of ankle joint 11/16/2014     Hypothyroidism      Pneumonia of right lung due to infectious organism, unspecified part of lung      Severe aortic stenosis      No current outpatient prescriptions on file.         Allergies   Allergen Reactions     Adhesive Tape-Silicones Other (See Comments)     Patient has crepy skin and 3M red dot leads causes skin to come off.  Use blue round sensitive skin leads.     Blood-Group Specific Substance Unknown     Anti-E and Warm autoantibodies present. Expect delays in blood for transfusion up to 24 hours.   Draw 2 lavender and 1 red tube for type and screen orders.     Oxycodone Other (See Comments)     Hallucinations     Percocet [Oxycodone-Acetaminophen] Other (See Comments)     Hallucinations.  Can take plain acetaminophen.     Vasotec [Enalaprilat] Cough         Review of Systems   No fevers or chills. No headache, lightheadedness or dizziness. Continued fatigue. No shortness of breath or chest pains or palpitations. Appetite is poor.  No nausea, vomiting, constipation or diarrhea.  No dysuria, frequency, burning or pain with urination. Denies any pain in her eyes.     Physical Exam   PHYSICAL EXAMINATION:  Vital signs: /66, P 68, R 16, T 96.1, O2 sat 91%. Current weight 120.2 pounds   General: Awake, Alert, appropriately, follows simple commands, conversant.  Appears overall fatigue.   HEENT:Reddened conjunctiva and sclera. Moderate tearing.  Moist oral mucosa  NECK: Supple, without any lymphadenopathy, or masses  CVS:  S1  S2, without murmur or gallop.   LUNG: No shortness of breath at rest. Occasional crackles in her LL.   BACK: No  kyphosis of the thoracic spine  ABDOMEN: Soft, nontender to palpation, with positive bowel sounds  EXTREMITIES: Good range of motion on both upper and lower extremities, 1+pedal edema, no calf tenderness  SKIN: Multiple bruises to upper and lower extremities.  Scabs along the shins.  NEUROLOGIC: Intact, pulses palpable  PSYCHIATRIC: Cognition intact.  Flat affect.            Labs:    Recent Results (from the past 240 hour(s))   HM2(CBC w/o Differential)   Result Value Ref Range    .0 (HH) 4.0 - 11.0 thou/uL    RBC 2.77 (L) 3.80 - 5.40 mill/uL    Hemoglobin 7.8 (L) 12.0 - 16.0 g/dL    Hematocrit 26.2 (L) 35.0 - 47.0 %    MCV 95 80 - 100 fL    MCH 28.2 27.0 - 34.0 pg    MCHC 29.8 (L) 32.0 - 36.0 g/dL    RDW 19.3 (H) 11.0 - 14.5 %    Platelets 181 140 - 440 thou/uL    MPV 11.3 8.5 - 12.5 fL   Basic Metabolic Panel   Result Value Ref Range    Sodium 134 (L) 136 - 145 mmol/L    Potassium 3.4 (L) 3.5 - 5.0 mmol/L    Chloride 98 98 - 107 mmol/L    CO2 22 22 - 31 mmol/L    Anion Gap, Calculation 14 5 - 18 mmol/L    Glucose 64 (L) 70 - 125 mg/dL    Calcium 8.1 (L) 8.5 - 10.5 mg/dL    BUN 40 (H) 8 - 28 mg/dL    Creatinine 1.64 (H) 0.60 - 1.10 mg/dL    GFR MDRD Af Amer 36 (L) >60 mL/min/1.73m2    GFR MDRD Non Af Amer 29 (L) >60 mL/min/1.73m2   ECG 12 lead   Result Value Ref Range    SYSTOLIC BLOOD PRESSURE  mmHg    DIASTOLIC BLOOD PRESSURE  mmHg    VENTRICULAR RATE 69 BPM    ATRIAL RATE 69 BPM    P-R INTERVAL 140 ms    QRS DURATION 150 ms    Q-T INTERVAL 460 ms    QTC CALCULATION (BEZET) 492 ms    P Axis  degrees    R AXIS -39 degrees    T AXIS 129 degrees    MUSE DIAGNOSIS       Sinus rhythm with occasional Premature ventricular complexes  Left axis deviation  Left bundle branch block  Abnormal ECG  When compared with ECG of 02-JUL-2018 20:53,  Premature ventricular complexes are now Present  QRS duration has decreased  Confirmed by MALI LANCASTER MD LOC:[facility] ([id number]) on 7/20/2018 3:31:23 PM     White Blood Count (WBC)    Result Value Ref Range    .1 (HH) 4.0 - 11.0 thou/uL   Comprehensive Metabolic Panel   Result Value Ref Range    Sodium 134 (L) 136 - 145 mmol/L    Potassium 5.1 (H) 3.5 - 5.0 mmol/L    Chloride 99 98 - 107 mmol/L    CO2 19 (L) 22 - 31 mmol/L    Anion Gap, Calculation 16 5 - 18 mmol/L    Glucose 27 (LL) 70 - 125 mg/dL    BUN 47 (H) 8 - 28 mg/dL    Creatinine 1.48 (H) 0.60 - 1.10 mg/dL    GFR MDRD Af Amer 40 (L) >60 mL/min/1.73m2    GFR MDRD Non Af Amer 33 (L) >60 mL/min/1.73m2    Bilirubin, Total 0.4 0.0 - 1.0 mg/dL    Calcium 8.5 8.5 - 10.5 mg/dL    Protein, Total 5.0 (L) 6.0 - 8.0 g/dL    Albumin 2.4 (L) 3.5 - 5.0 g/dL    Alkaline Phosphatase 275 (H) 45 - 120 U/L    AST 31 0 - 40 U/L    ALT 11 0 - 45 U/L   Uric Acid   Result Value Ref Range    Uric Acid 23.6 (H) 2.0 - 7.5 mg/dL   HM1 (CBC with Diff)   Result Value Ref Range    .4 (HH) 4.0 - 11.0 thou/uL    RBC 2.68 (L) 3.80 - 5.40 mill/uL    Hemoglobin 7.1 (L) 12.0 - 16.0 g/dL    Hematocrit 25.6 (L) 35.0 - 47.0 %    MCV 96 80 - 100 fL    MCH 26.5 (L) 27.0 - 34.0 pg    MCHC 27.7 (L) 32.0 - 36.0 g/dL    RDW 19.7 (H) 11.0 - 14.5 %    Platelets 169 140 - 440 thou/uL    MPV 11.3 8.5 - 12.5 fL   Manual Differential   Result Value Ref Range    Total Neutrophils % 6 (L) 50 - 70 %    Lymphocytes % 93 (H) 20 - 40 %    Monocytes % 2 2 - 10 %    Eosinophils %  0 0 - 6 %    Basophils % 0 0 - 2 %    Total Neutrophils Absolute 29.0 (H) 2.0 - 7.7 thou/ul    Lymphocytes Absolute 490.5 (H) 0.8 - 4.4 thou/uL    Monocytes Absolute 7.9 (H) 0.0 - 0.9 thou/uL    Eosinophils Absolute 0.0 0.0 - 0.4 thou/uL    Basophils Absolute 0.0 0.0 - 0.2 thou/uL    Manual nRBC per 100 Cells 1 (H) 0-<1    Smudge Cells Present (!) None Seen    Platelet Estimate Normal Normal    Polychromasia 1+ (!) Negative   Basic Metabolic Panel   Result Value Ref Range    Sodium 129 (L) 136 - 145 mmol/L    Potassium 3.6 3.5 - 5.0 mmol/L    Chloride 99 98 - 107 mmol/L    CO2 24 22 - 31 mmol/L    Anion  Gap, Calculation 6 5 - 18 mmol/L    Glucose 78 70 - 125 mg/dL    Calcium 7.7 (L) 8.5 - 10.5 mg/dL    BUN 47 (H) 8 - 28 mg/dL    Creatinine 1.59 (H) 0.60 - 1.10 mg/dL    GFR MDRD Af Amer 37 (L) >60 mL/min/1.73m2    GFR MDRD Non Af Amer 31 (L) >60 mL/min/1.73m2   Troponin I   Result Value Ref Range    Troponin I 0.06 0.00 - 0.29 ng/mL   Thyroid Stimulating Hormone (TSH)   Result Value Ref Range    TSH 5.79 (H) 0.30 - 5.00 uIU/mL   Magnesium   Result Value Ref Range    Magnesium 2.2 1.8 - 2.6 mg/dL   Hepatic Profile   Result Value Ref Range    Bilirubin, Total 0.5 0.0 - 1.0 mg/dL    Bilirubin, Direct 0.2 <=0.5 mg/dL    Protein, Total 5.0 (L) 6.0 - 8.0 g/dL    Albumin 2.4 (L) 3.5 - 5.0 g/dL    Alkaline Phosphatase 269 (H) 45 - 120 U/L    AST 60 (H) 0 - 40 U/L    ALT 12 0 - 45 U/L   Lipase   Result Value Ref Range    Lipase 9 0 - 52 U/L   INR   Result Value Ref Range    INR 1.04 0.90 - 1.10   Calcium   Result Value Ref Range    Calcium 7.7 (L) 8.5 - 10.5 mg/dL   Phosphorus   Result Value Ref Range    Phosphorus 4.2 2.5 - 4.5 mg/dL   HM1 (CBC with Diff)   Result Value Ref Range    .8 (HH) 4.0 - 11.0 thou/uL    RBC 2.68 (L) 3.80 - 5.40 mill/uL    Hemoglobin 7.0 (L) 12.0 - 16.0 g/dL    Hematocrit 24.2 (L) 35.0 - 47.0 %    MCV 90 80 - 100 fL    MCH 26.1 (L) 27.0 - 34.0 pg    MCHC 28.9 (L) 32.0 - 36.0 g/dL    RDW 19.4 (H) 11.0 - 14.5 %    Platelets 168 140 - 440 thou/uL    MPV 11.1 8.5 - 12.5 fL   POCT occult blood stool   Result Value Ref Range    POC Fecal Occult Bld Negative Negative    Lot Number [id number]     Expiration Date 3-21     Diluent/Developer Lot Number [id number]     Diluent/Developer Expiration Date 2021-11     Pos Control Valid Control Valid Control    Neg Control Valid Control Valid Control   Type and Screen   Result Value Ref Range    ABORh O POS     Antibody Screen Negative Negative   BNP(B-type Natriuretic Peptide)   Result Value Ref Range    BNP 1273 (H) 0 - 167 pg/mL   Lactic Acid   Result Value Ref Range     Lactic Acid 1.3 0.5 - 2.2 mmol/L   Urinalysis   Result Value Ref Range    Color, UA Yellow Colorless, Yellow, Straw, Light Yellow    Clarity, UA Clear Clear    Glucose, UA Negative Negative    Bilirubin, UA Negative Negative    Ketones, UA Negative Negative, 60 mg/dL    Specific Gravity, UA 1.007 1.001 - 1.030    Blood, UA Negative Negative    pH, UA 5.0 4.5 - 8.0    Protein, UA Negative Negative mg/dL    Urobilinogen, UA <2.0 E.U./dL <2.0 E.U./dL, 2.0 E.U./dL    Nitrite, UA Negative Negative    Leukocytes, UA Negative Negative   Crossmatch   Result Value Ref Range    Crossmatch COMPATIBLE     Blood Expiration Date 80591030811277     Unit Type O Pos     Unit Number [id number]     Status Ready     Component Red Blood Cells     PRODUCT CODE [id number]     Blood Type 5100     CODING SYSTEM [id number]    POCT Glucose   Result Value Ref Range    Glucose, POC 89 mg/dL       Assesemnt /Plan:   1. Chronic lymphoid leukemia (H)     2. Pink eye     3. Leukocytosis     4. Anemia     5. Atrial fibrillation (H)     6. S/P TAVR (transcatheter aortic valve replacement)     7. Aortic stenosis       Pt with CLL. She has continued with Leukocytosis in the 300s. Labs are pending today. She is followed by Hem/Onc. She continues on Imbruvica. She recently was restarted on Prednisone. Chronic anemia. She is scheduled for blood transfusion tomorrow. She is c/o pain in her left foot. She has underlying gout. I will have uric acid level to today's labs. S/P TAVR.She denies any shortness of breath or CP. She does have 1+ edema. Overall she is very weak and fatigued.  Atrial fib. She continues on Plavix however cardiology suggested if continued anemia to stop it. She continues on treatment for CLL.  Laboratory pending.     Pt will transfer to LTC in am. I did speak with pt about hospice today due to continued decline. She will talk with her daughter. Ok to transfer with current meds and treatments.     Total time spent for this visit was 35  minutes with > 50% if time spent in counseling and coordination of care.     Electronically signed by: Susie Walsh CNP

## 2021-06-26 NOTE — PROGRESS NOTES
Progress Notes by Jyoti Toney PA-C at 6/11/2018 10:30 AM     Author: Jyoti Toney PA-C Service: -- Author Type: Physician Assistant    Filed: 6/11/2018  1:20 PM Encounter Date: 6/11/2018 Status: Signed    : Jyoti Toney PA-C (Physician Assistant)           Click to link to [facility]     [facility] HEART CARE NOTE        Assessment/Recommendations   Problem List Items Addressed This Visit     CLL (chronic lymphocytic leukemia) (Chronic)    Severe aortic stenosis (Chronic)    Relevant Orders    HM1 (CBC with Diff) (Completed)    Manual Differential (Completed)    Persistent atrial fibrillation (Chronic)    Acute respiratory failure with hypoxia    Acute on chronic systolic and diastolic heart failure, NYHA class 3 - Primary      Other Visit Diagnoses     CHF (congestive heart failure)              1.  Aortic Stenosis: s/p BAV on April 4.  The stenosis has become severe and has caused 2 heart failure admission since April.  She is having fatigue, shortness of breath and PERRY.  She has seen a marked decline in her functional status.  She STS risk score is around 8% and she is NYHA class III heart failure.  She has undergone all the required workup for TAVR (transcatheter aortic valve replacement).  We discussed the procedure in detail.   She understands that there is a 4-5% risk of bleeding, infection, stroke, heart block requiring permanent pacemaker, cardiac perforation, aortic root rupture, dissection and death. She wishes to proceed with TAVR.    All questions were answered   Consents were signed and witnessed by me.  She has never had trouble with anesthesia in the past.    Labs today reveal Hgb 8.1, WBC of 195.2 (which is not surprising with her CLL and this was discussed with Dr. Pinto), electrolytes show Na+ 135 and K+ 4.4, creat 1.47.    The plan will be for general anesthesia with LYNSEY monitoring.  We will use embolic protection device, will axis via right  transfemoral and use an Macdonald Gretta valve.  Brandi Patiño will report tomorrow morning for the procedure and all instructions regarding times and medications were given by TAVR coordinator RN.     2. Hypertension: Blood pressure well controlled at today's visit.  Currently taking diltiazem 300 mg daily, Lasix 80 mg twice daily and metolazone 2.5 mg daily    3. Chronic systolic heart failure with recent acute exacerbation: Well compensated as of today on Lasix 80 mg twice daily and Zaroxolyn 2.5 mg daily.    4.  Atrial fibrillation: Paroxysmal.  Maintained on diltiazem 300 mg daily.  Not on anticoagulation secondary to issues with bleeding from CLL.  She understands risk of stroke not being on anticoagulation but is not super interested in another procedure, i.e. Watchman.  I would not recommend anticoagulation or MAREK device at this point, but can revisit following TAVR.   [id number]-MVAo score is a 5    5. CLL with enlarging lung mass: Bronchoscopy with biopsy revealed the mass to be CLL.  No signs of mantle cell lymphoma.  Oncology states that prognosis is the same regardless of the biopsy results and that her prognosis is greater than 1 year survival.  She is currently on prednisone.  Her chemotherapy pill is on hold currently.     History of Present Illness    Brandi Patiño is a 89 y.o. female who comes in today for history and physical prior to TAVR (transcatheter aortic valve replacement).  We have her daughters are with her today.    Brandi has had known aortic stenosis for quite some time but it has become to cause problems with acute exacerbations of her heart failure, with one admission in early April followed by another admission recently from May 25 - June 7.  During the admission in April, the patient had transthoracic echo showing aortic valve area 0.7 cm  with mean gradient 65 mmHg and EF 42%.  She had cardiac cath during that admission as well that showed no obstructive coronary disease.   She was taken back to the Cath Lab the next day and underwent balloon aortic valvuloplasty, taking her gradient from 63-44.    She was discharged home with anticipated TAVR (transcatheter aortic valve replacement), but was admitted to the hospital on May 25 with acute respiratory failure with hypoxia.  She had received 2 units of packed red blood cells the day prior for her CLL and came in through the emergency department with increased shortness of breath.  She was treated with IV diuretics, CPAP and BiPAP as well as IV steroids.  On CT scan she was found to have an enlarging lung mass and finally ended up undergoing bronchoscopy with biopsy.  Biopsy came back showing CLL/small lymphocytic lymphoma.  No evidence of mantle cell lymphoma.  During the admission, she required more transfusions for drop in hemoglobin.  Discussion with medical oncology during the admission noted that there prognosis for her CLL would not be altered based on the results from the CT scan/bronchoscopy biopsy.  She did have an episode of atrial fibrillation on presentation to the hospital, with RVR and the plan was to start her on Coumadin.  However based on her history with CLL and low hemoglobins this was not advised.  She recovered from a respiratory standpoint and after good diuresis was discharged to TCU last week.    Today she comes in with 3 of her daughters and states that she is feeling okay.  She does not have significant shortness of breath but really has not done much since discharge from her hospitalization.  She is quite fatigued.  Her appetite has been better since being discharged from the hospital.  She has significant amount of bruising on both her upper and lower extremities.    Brandi Patiño denies chest discomfort, palpitations, paroxysmal nocturnal dyspnea, orthopnea, lightheadedness, dizziness, pre-syncope, or syncope.  Brandi Patiño also denies any weight loss, changes in appetite, nausea or vomiting.  "    Medical, surgical, family, social history, and medications were reviewed and updated as necessary.    Cardiographs/Other testing  EC2018 shows NSR with LBBB    Echocardiogram: see results from  listed above in HPI    Cath:  No obstructive CAD       Physical Examination Review of Systems   Vitals:    18 1042   BP: 104/50   Pulse: 80   Resp: 14     Body mass index is 23.96 kg/(m^2).  Wt Readings from Last 3 Encounters:   18 131 lb (59.4 kg)   18 130 lb 4.8 oz (59.1 kg)   18 139 lb 9.6 oz (63.3 kg)       General Appearance:   Alert, cooperative and in no acute distress   ENT/Mouth: membranes moist, no oral lesions or bleeding gums.      EYES:  no scleral icterus, normal conjunctivae   Neck: no carotid bruits.  Thyroid not visualized   Chest/Lungs:   lungs reveal crackles at both bases, no rales or wheezing   Cardiovascular:   Regular. Normal first and second heart sounds with 5/6 systolic murmur.  No rubs or gallops; the carotid, radial and posterior tibial pulses are intact, no edema bilaterally    Abdomen:  Soft and nontender. Bowel sounds are present in all quadrants   Extremities: no cyanosis or clubbing   Skin: no xanthelasma, warm.  Significant amount of bruising.  There are also skin tears in several locations.  Skin tear on left leg is covered and has minimal weeping onto bandage   Neurologic: normal gait, normal  bilateral, no tremors   Psychiatric: Normal mood and affect                                              Medical History  Surgical History Family History Social History   Past Medical History:   Diagnosis Date   ? Acute non-ST elevation myocardial infarction (NSTEMI) 2018    but normal coronary angiogram 4 days later   ? Anemia 2015   ? Chest pain with normal coronary angiography 2018    at time of \"NSTEMI\"   ? Chronic diastolic heart failure due to valvular disease    ? CLL (chronic lymphocytic leukemia)     Transfusion dependent   ? DNAR " (do not attempt resuscitation)    ? Essential hypertension    ? Fracture dislocation of ankle joint 11/16/2014   ? Hypothyroidism    ? Severe aortic stenosis     Past Surgical History:   Procedure Laterality Date   ? APPENDECTOMY     ? BRONCHOSCOPY N/A 5/31/2018    Procedure: BRONCHOSCOPY;  Surgeon: Vinay Frank MD;  Location: [facility];  Service:    ? COLECTOMY N/A    ? COLONOSCOPY N/A 1/17/2018    Procedure: COLONOSCOPY WITH CECUM AND RECTUM BIOPSY;  Surgeon: Varun Allen MD;  Location: [facility] OR;  Service:    ? CV CORONARY ANGIOGRAM N/A 4/2/2018    Procedure: Coronary Angiogram;  Surgeon: Elroy Ybarra MD;  Location: [facility] Cath Lab;  Service:    ? HYSTERECTOMY     ? ORIF ANKLE FRACTURE Left 11/17/2014    Procedure: LEFT ANKLE FRACTURE OPEN REDUCTION INTERNAL FIXATION;  Surgeon: Luis Blount DPM;  Location: [facility];  Service:    ? TONSILLECTOMY      Family History   Problem Relation Age of Onset   ? No Medical Problems Mother    ? No Medical Problems Father    ? No Medical Problems Daughter    ? No Medical Problems Daughter    ? No Medical Problems Daughter    ? No Medical Problems Daughter    ? No Medical Problems Son    ? No Medical Problems Son    ? Valvular heart disease Neg Hx     Social History     Social History   ? Marital status:      Spouse name: N/A   ? Number of children: 6   ? Years of education: N/A     Occupational History   ? Not on file.     Social History Main Topics   ? Smoking status: Former Smoker     Years: 20.00     Types: Cigarettes     Quit date: 11/17/2014   ? Smokeless tobacco: Never Used   ? Alcohol use No   ? Drug use: No   ? Sexual activity: Not Currently     Partners: Male     Other Topics Concern   ? Not on file     Social History Narrative          Medications  Allergies   Current Outpatient Prescriptions   Medication Sig Dispense Refill   ? aspirin 81 mg chewable tablet Chew 1 tablet (81 mg total) daily.  0    ? clonazePAM (KLONOPIN) 0.5 MG tablet Take 1 tablet (0.5 mg total) by mouth at bedtime as needed. 10 tablet 0   ? diltiazem (CARDIZEM CD) 300 MG 24 hr capsule Take 300 mg by mouth daily.     ? furosemide (LASIX) 40 MG tablet Take 2 tablets (80 mg total) by mouth 2 (two) times a day at 9am and 6pm.  0   ? levothyroxine (SYNTHROID, LEVOTHROID) 125 MCG tablet Take 125 mcg by mouth Daily at 6:00 am.      ? metOLazone (ZAROXOLYN) 2.5 MG tablet Take 1 tablet (2.5 mg total) by mouth daily. 30 tablet 0   ? multivitamin with minerals (THERA-M) 9 mg iron-400 mcg Tab tablet Take 1 tablet by mouth daily.     ? potassium chloride (KLOR-CON) 10 MEQ CR tablet Take 2 tablets (20 mEq total) by mouth daily.  0   ? predniSONE (DELTASONE) 20 MG tablet Take 20 mg by mouth daily.     ? psyllium (METAMUCIL) 3.4 gram packet Take 1 packet by mouth daily.     ? traZODone (DESYREL) 50 MG tablet Take 1 tablet (50 mg total) by mouth at bedtime as needed for sleep. (Patient taking differently: Take 50 mg by mouth at bedtime. ) 20 tablet 0     No current facility-administered medications for this visit.       Allergies   Allergen Reactions   ? Blood-Group Specific Substance Unknown     Anti-E and Warm autoantibodies present. Expect delays in blood for transfusion up to 24 hours.   Draw 2 lavender and 1 red tube for type and screen orders.   ? Percocet [Oxycodone-Acetaminophen] Other (See Comments)     Hallucinations.  Can take plain acetaminophen.   ? Vasotec [Enalaprilat] Cough         Lab Results    Chemistry/lipid CBC Cardiac Enzymes/BNP/TSH/INR   Lab Results   Component Value Date    CHOL 199 06/29/2016    HDL 65 06/29/2016    LDLCALC 113 06/29/2016    TRIG 104 06/29/2016    CREATININE 1.47 (H) 06/11/2018    BUN 72 (H) 06/11/2018    K 4.4 06/11/2018     (L) 06/11/2018    CL 97 (L) 06/11/2018    CO2 23 06/11/2018    Lab Results   Component Value Date    .2 (HH) 06/11/2018    HGB 8.1 (L) 06/11/2018    HCT 26.9 (L) 06/11/2018     MCV 97 06/11/2018     06/11/2018    Lab Results   Component Value Date    CKMB 2 04/01/2018    TROPONINI 0.27 05/28/2018    BNP 2528 (H) 06/11/2018    TSH 4.06 11/27/2017    INR 1.05 06/11/2018          40 minutes were spent with the patient with greater than 50% spent on education and counseling.    This note has been dictated using voice recognition software. Any grammatical or context distortions are unintentional and inherent to the software.    Jyoti Velarde PA-C, MPAS  Structural Heart Program  [facility]

## 2021-06-26 NOTE — PROGRESS NOTES
Progress Notes by Jyoti Toney PA-C at 6/25/2018  2:50 PM     Author: Jyoti Toney PA-C Service: -- Author Type: Physician Assistant    Filed: 6/25/2018  3:39 PM Encounter Date: 6/25/2018 Status: Signed    : Jyoti Toney PA-C (Physician Assistant)           Click to link to [facility] HEART [facility] NOTE      Assessment/Recommendations   Diagnoses and all orders for this visit:    Severe aortic stenosis - S/P TAVR on June 12.  Patient significantly deconditioned and may be has seen minimal improvement in her breathing.  She is still weak/tired but mostly from ongoing anemia.  Postprocedure echocardiogram showed mean gradient of 17 mmHg and repeat echo on June 20 showed increase of the gradient to 24 mmHg.  Continue with aspirin and Plavix at this time.  Increase gradient could represent either micro emboli on valve surface or fluid overload.  We are unfortunately unable to start anticoagulation at this time to treat any possible micro emboli because of her ongoing anemia and possible slow GI blood loss.  I will try to more aggressively diurese her to see if gradient will improve with better volume status.    Persistent atrial fibrillation -rate controlled.  Not on anticoagulation at present time due to ongoing anemia with CLL.  Last hemoglobin was yesterday and was seven-point 3:09 units of blood.  She will have repeat hemoglobin done on Wednesday at oncology office.    CLL (chronic lymphocytic leukemia) -being managed by oncology and will see him on Wednesday.  Her Imbruvica was held prior to TAVR.  Recommendations during most recent hospitalization was to resume this.  It was on her discharge med list, but facility did not administer the med to her last night.  I ordered it again on facility order sheet.  Oncology feels that this will help maintain her hemoglobin unless anemia is indeed from GI blood loss.    Heart failure with reduced EF -with fluid  overload after blood transfusion.  Currently on torsemide 40 mg daily.  Will add Zaroxolyn 2.5 mg daily for 3 days.  Repeat BMP on Wednesday.  We will try to be as aggressive as possible with diuresis if kidneys allow.  Repeat echocardiogram on July 12.    C. Diff -diagnosed on most recent hospitalization and now on vancomycin.  Still having significant amounts of diarrhea, but not bloody.    Thank you for the opportunity to participate in the care of Brandi Patiño. It's been a pleasure working with her.  Please do not hesitate to call with any questions or concerns.     History of Present Illness    I had the opportunity to see Brandi Patiño at the [facility] for HER-2 week follow-up visit after TAVR (transcatheter aortic valve replacement).  She is with her son and daughter.    Brandi is a very complicated past medical history.  She has CLL, chronic atrial fibrillation, aortic stenosis, chronic systolic heart failure, chronic kidney disease, anemia of chronic disease with recent acute blood loss anemia and enlarging lung mass.  She was admitted in early June for acute respiratory failure with hypoxia and during that hospitalization was aggressively diuresed.  She had CT scan that showed a enlarging mass in her lung and this was biopsied showing CLL.  Oncology stated that there was no change in her prognosis or life expectancy and she had been cleared to undergo transcatheter valve replacement for her severe aortic stenosis.    Also within the last several months she had required more frequent transfusions for acute blood loss anemia.  She had been on Imbruvica for her CLL which was maintaining her hemoglobin quite well, however once we determined that she was a good candidate for transcatheter valve replacement, that medicine was placed on hold and her Hgb now keeps dropping.      Her TAVR (transcatheter aortic valve replacement) was done on June 12.  Echocardiogram done on June 13  showed EF 45% with mean gradient of 17 mmHg.  She was sent to [facility] on June 14 and hemoglobin was checked several days later and had dropped significantly into the 6 range.  She was scheduled for blood transfusion on June 20 however oncology wanted her admitted to the hospital.  During hospitalization she was tested for C. difficile which came back positive.  She was started on vancomycin treatment.  She was transfused 3 units of red blood cells and her hemoglobin came up to 7.9 at discharge as of yesterday.  Repeat echocardiogram on June 20 showed mean gradient increased to 24 mmHg however we are unable to anticoagulate her with the possible slow GI blood loss even though there is a possibility that she has micro emboli with the increase of the mean gradient.  Mean gradient could also be increased because of volume status.  She is currently taking torsemide 40 mg daily.  She states that she thinks her breathing is about the same as it was prior to the valve replacement.    Brandi Patiño denies exertional chest discomfort, palpitations, shortness of breath at rest, PND, orthopnea, lightheadedness, dizziness, pre-syncope or syncope.  Brandi Patiño also denies any recent weight loss, changes in appetite, nausea or vomiting.   ____________________________________________________________  Echo: see echo results from June 13 and June 20 listed above in HPI.  Personally reviewed.      Physical Examination Review of Systems   Vitals:    06/25/18 1442   BP: 120/50   Pulse: 64   Resp: 18     Body mass index is 23.78 kg/(m^2).  Wt Readings from Last 3 Encounters:   06/25/18 130 lb (59 kg)   06/23/18 129 lb 14.4 oz (58.9 kg)   06/14/18 131 lb (59.4 kg)       General Appearance:   Alert, cooperative and in no acute distress   ENT/Mouth: membranes moist, no oral lesions or bleeding gums.      EYES:  no scleral icterus, normal conjunctivae   Neck: no carotid bruits.  Thyroid not visualized  "  Chest/Lungs:   lungs reveal crackles at the bases bilateral   Cardiovascular:   irregular. Normal first and second heart sounds with no murmurs, rubs, or gallops; the carotid, radial and posterior tibial pulses are intact, no edema bilaterally    Abdomen:  Soft and nontender. Bowel sounds are present in all quadrants   Extremities: no cyanosis or clubbing.  Puncture site is healing well   Skin: no xanthelasma, warm.    Neurologic: normal gait, normal  bilateral, no tremors   Psychiatric: Normal mood and affect    General: WNL  Eyes: WNL  Ears/Nose/Throat: WNL  Lungs: Shortness of Breath  Heart: Shortness of Breath with activity  Stomach: Diarrhea  Bladder: WNL  Muscle/Joints: Muscle Weakness  Skin: Poor Wound Healing  Nervous System: WNL  Mental Health: Anxiety     Blood: Easy Bruising     Medical History  Surgical History Family History Social History   Past Medical History:   Diagnosis Date   ? Acute non-ST elevation myocardial infarction (NSTEMI) (H) 03/29/2018    but normal coronary angiogram 4 days later   ? Anemia 6/8/2015   ? Chest pain with normal coronary angiography 04/02/2018    at time of \"NSTEMI\"   ? Chronic diastolic heart failure due to valvular disease (H)    ? CLL (chronic lymphocytic leukemia) (H)     Transfusion dependent   ? DNAR (do not attempt resuscitation)    ? Essential hypertension    ? Fracture dislocation of ankle joint 11/16/2014   ? Hypothyroidism    ? Pneumonia of right lung due to infectious organism, unspecified part of lung    ? Severe aortic stenosis     Past Surgical History:   Procedure Laterality Date   ? APPENDECTOMY     ? BRONCHOSCOPY N/A 5/31/2018    Procedure: BRONCHOSCOPY;  Surgeon: Vinay Frank MD;  Location: [facility] OR;  Service:    ? COLECTOMY N/A    ? COLONOSCOPY N/A 1/17/2018    Procedure: COLONOSCOPY WITH CECUM AND RECTUM BIOPSY;  Surgeon: Varun Allen MD;  Location: [facility] OR;  Service:    ? CV CORONARY ANGIOGRAM N/A 4/2/2018    " Procedure: Coronary Angiogram;  Surgeon: Elroy Ybarra MD;  Location: [facility] Cath Lab;  Service:    ? CV TRANSFEMORAL TRANSCATHETER VALVE REPLACEMENT N/A 6/12/2018    Procedure: Transfemoral Transcatheter Aortic Valve Replacement;  Surgeon: Gabino Irving MD;  Location: [facility] Cath Lab;  Service:    ? HYSTERECTOMY     ? ORIF ANKLE FRACTURE Left 11/17/2014    Procedure: LEFT ANKLE FRACTURE OPEN REDUCTION INTERNAL FIXATION;  Surgeon: Luis Blount DPM;  Location: [facility] OR;  Service:    ? TONSILLECTOMY      Family History   Problem Relation Age of Onset   ? No Medical Problems Mother    ? No Medical Problems Father    ? No Medical Problems Daughter    ? No Medical Problems Daughter    ? No Medical Problems Daughter    ? No Medical Problems Daughter    ? No Medical Problems Son    ? No Medical Problems Son    ? Valvular heart disease Neg Hx     Social History     Social History   ? Marital status:      Spouse name: N/A   ? Number of children: 6   ? Years of education: N/A     Occupational History   ? Not on file.     Social History Main Topics   ? Smoking status: Former Smoker     Years: 20.00     Types: Cigarettes     Quit date: 11/17/2014   ? Smokeless tobacco: Never Used   ? Alcohol use No   ? Drug use: No   ? Sexual activity: Not Currently     Partners: Male     Other Topics Concern   ? Not on file     Social History Narrative          Medications  Allergies   Current Outpatient Prescriptions   Medication Sig Dispense Refill   ? aspirin 81 mg chewable tablet Chew 1 tablet (81 mg total) daily.  0   ? calcium, as carbonate, (TUMS) 200 mg calcium (500 mg) chewable tablet Chew 1 tablet (200 mg total) 3 (three) times a day for 7 days. 21 tablet 0   ? clonazePAM (KLONOPIN) 0.5 MG tablet Take 1 tablet (0.5 mg total) by mouth at bedtime as needed (sleep). 10 tablet 0   ? clopidogrel (PLAVIX) 75 mg tablet Take 1 tablet (75 mg total) by mouth daily. 30 tablet 0   ? diltiazem  (CARDIZEM CD) 300 MG 24 hr capsule Take 300 mg by mouth daily.     ? levothyroxine (SYNTHROID, LEVOTHROID) 125 MCG tablet Take 125 mcg by mouth Daily at 6:00 am.      ? multivitamin with minerals (THERA-M) 9 mg iron-400 mcg Tab tablet Take 1 tablet by mouth daily.     ? omeprazole (PRILOSEC) 20 MG capsule Take 1 capsule (20 mg total) by mouth 2 (two) times a day before meals. 60 capsule 0   ? predniSONE (DELTASONE) 20 MG tablet Take 20 mg by mouth daily.     ? psyllium (METAMUCIL) 3.4 gram packet Take 1 packet by mouth daily.     ? torsemide (DEMADEX) 20 MG tablet Take 2 tablets (40 mg total) by mouth 2 (two) times a day at 9am and 6pm. 120 tablet 0   ? traZODone (DESYREL) 50 MG tablet Take 1 tablet (50 mg total) by mouth at bedtime as needed for sleep. 20 tablet 0   ? vancomycin (VANCOCIN) 125 mg/2.5 mL Syrg solution Take 2.5 mL (125 mg total) by mouth 4 (four) times a day at 6am, 12noon, 6pm and 10pm. for 7 days. 70 mL 0   ? ibrutinib (IMBRUVICA) 140 mg cap capsule Take by mouth daily.       No current facility-administered medications for this visit.       Allergies   Allergen Reactions   ? Blood-Group Specific Substance Unknown     Anti-E and Warm autoantibodies present. Expect delays in blood for transfusion up to 24 hours.   Draw 2 lavender and 1 red tube for type and screen orders.   ? Oxycodone Other (See Comments)     Hallucinations   ? Vasotec [Enalaprilat] Cough         Lab Results    Chemistry/lipid CBC Cardiac Enzymes/BNP/TSH/INR   Lab Results   Component Value Date    CHOL 199 06/29/2016    HDL 65 06/29/2016    LDLCALC 113 06/29/2016    TRIG 104 06/29/2016    CREATININE 1.42 (H) 06/24/2018    BUN 50 (H) 06/23/2018    K 4.0 06/24/2018     (L) 06/23/2018     06/23/2018    CO2 20 (L) 06/23/2018    Lab Results   Component Value Date    .6 (HH) 06/21/2018    HGB 7.9 (L) 06/24/2018    HCT 26.2 (L) 06/21/2018    MCV 93 06/21/2018     (L) 06/21/2018    Lab Results   Component Value  Date    CKMB 2 04/01/2018    TROPONINI 0.19 06/14/2018    BNP 2528 (H) 06/11/2018    TSH 4.06 11/27/2017    INR 1.16 (H) 06/19/2018          40 minutes were spent with the patient with greater than 50% spent on education and counseling.    This note has been dictated using voice recognition software. Any grammatical or context distortions are unintentional and inherent to the software.    Jyoti Toney PA-C, MPAS  Structural [facility] Program  [facility]

## 2021-07-03 NOTE — ADDENDUM NOTE
Addendum Note by Prakash Anderson at 3/3/2018  7:51 PM     Author: Prakash Anderson Service: -- Author Type:     Filed: 3/3/2018  7:51 PM Encounter Date: 2/28/2018 Status: Signed    : Prakash Anderson ()    Addended by: PRAKASH ANDERSON on: 3/3/2018 07:51 PM        Modules accepted: Orders

## 2021-07-03 NOTE — ADDENDUM NOTE
Addendum Note by Renate Milligan CLS at 1/15/2018  5:27 PM     Author: Renate Milligan CLS Service: -- Author Type:     Filed: 1/15/2018  5:27 PM Encounter Date: 1/15/2018 Status: Signed    : Renate Milligan CLS ()    Addended by: RENATE MILLIGAN on: 1/15/2018 05:27 PM        Modules accepted: Orders

## 2021-07-03 NOTE — ADDENDUM NOTE
Addendum Note by Anselmo Mendoza at 1/19/2017  7:17 AM     Author: Anselmo Mendoza Service: -- Author Type:     Filed: 1/19/2017  7:17 AM Encounter Date: 1/18/2017 Status: Signed    : Anselmo Mendzoa ()    Addended by: ANSELMO MENDOZA on: 1/19/2017 07:17 AM        Modules accepted: Orders

## 2021-07-03 NOTE — ADDENDUM NOTE
Addendum Note by Beth Santana at 8/16/2017  4:46 PM     Author: Beth Santana Service: -- Author Type:     Filed: 8/16/2017  4:46 PM Encounter Date: 8/16/2017 Status: Signed    : Beth Santana ()    Addended by: BETH SANTANA on: 8/16/2017 04:46 PM        Modules accepted: Orders

## 2023-10-10 NOTE — PROGRESS NOTES
Pt arrived via th e/wc with her son, Pt feeling very tired today, Iv started, pre med with tylenol, Pt transfused with 2 units of packed red blood cells slowly,, lasix given inbetween, Vouided x2 vss Iv was flushed with ns then dcd after ,Pt feeling better, wanted to walk out with her son, AVS given and pt left at 1345  Jessica Ibrahim   Statement Selected